# Patient Record
Sex: FEMALE | Race: WHITE | Employment: UNEMPLOYED | URBAN - METROPOLITAN AREA
[De-identification: names, ages, dates, MRNs, and addresses within clinical notes are randomized per-mention and may not be internally consistent; named-entity substitution may affect disease eponyms.]

---

## 2017-12-11 ENCOUNTER — OFFICE VISIT (OUTPATIENT)
Dept: OBGYN CLINIC | Age: 37
End: 2017-12-11

## 2017-12-11 VITALS
HEIGHT: 69 IN | WEIGHT: 234 LBS | SYSTOLIC BLOOD PRESSURE: 124 MMHG | BODY MASS INDEX: 34.66 KG/M2 | DIASTOLIC BLOOD PRESSURE: 80 MMHG

## 2017-12-11 DIAGNOSIS — Z01.419 ENCOUNTER FOR GYNECOLOGICAL EXAMINATION WITHOUT ABNORMAL FINDING: Primary | ICD-10-CM

## 2017-12-11 NOTE — PROGRESS NOTES
Annual exam ages 21-44    Mita Vazquez is a ,  31 y.o. female [facility] No LMP recorded. .    She presents for her annual checkup. She is having no significant problems. With regard to the Gardasil vaccine, she is older than the FDA approved age to receive it. Menstrual status:    Her periods are moderate in flow. She is using five to ten pads or tampons per day, usually regular and last 26-30 days. She denies dysmenorrhea. She reports no premenstrual symptoms. Contraception:    The current method of family planning is none. Sexual history:     She  reports that she currently engages in sexual activity and has had male partners. She reports using the following method of birth control/protection: Pill. .    Medical conditions:    Since her last annual GYN exam about one year ago, she has not the following changes in her health history: none. Pap and Mammogram History:    Her most recent Pap smear was normal, obtained 4 year(s) ago. The patient has never had a mammogram.    Breast Cancer History/Substance Abuse: negative    Past Medical History:   Diagnosis Date    AR (allergic rhinitis)     Basal cell carcinoma of face     left hairline    Infertility management      w dysmotility, not her issue    Infertility, female     IVF Pregnancy    Knee pain, bilateral     softball injuries    Miscarriage 2012    Pap smear 2009    Dr. Chloe Worley    Pap smear for cervical cancer screening 13 neg HPV NEG    Recurrent UTI 6/3/2013     Past Surgical History:   Procedure Laterality Date    HX MALIGNANT SKIN LESION EXCISION      basal cell       Current Outpatient Prescriptions   Medication Sig Dispense Refill    norgestimate-ethinyl estradiol (ORTHO-CYCLEN) 0.25-35 mg-mcg tab Take 1 Tab by mouth daily. 90 Tab 5    nitrofurantoin, macrocrystal-monohydrate, (MACROBID) 100 mg capsule Take 1 Cap by mouth daily as needed.  30 Cap 11    PNV no.24-iron-folic acid-dha (PRENATAL DHA+COMPLETE PRENATAL) -300 mg-mcg-mg cmpk Take  by mouth.  OTHER        Allergies: Latex     Tobacco History:  reports that she has never smoked. She has never used smokeless tobacco.  Alcohol Abuse:  reports that she drinks about 0.5 oz of alcohol per week   Drug Abuse:  reports that she does not use illicit drugs.     Family Medical/Cancer History:   Family History   Problem Relation Age of Onset    Cancer Father 48     pancreatic, dx 2/2009    Cancer Paternal Grandfather      cancer, unknown origin    Diabetes Maternal Grandmother     Hypertension Maternal Grandmother     Diabetes Paternal Grandmother     Hypertension Paternal Grandmother         Review of Systems - History obtained from the patient  Constitutional: negative for weight loss, fever, night sweats  HEENT: negative for hearing loss, earache, congestion, snoring, sorethroat  CV: negative for chest pain, palpitations, edema  Resp: negative for cough, shortness of breath, wheezing  GI: negative for change in bowel habits, abdominal pain, black or bloody stools  : negative for frequency, dysuria, hematuria, vaginal discharge  MSK: negative for back pain, joint pain, muscle pain  Breast: negative for breast lumps, nipple discharge, galactorrhea  Skin :negative for itching, rash, hives  Neuro: negative for dizziness, headache, confusion, weakness  Psych: negative for anxiety, depression, change in mood  Heme/lymph: negative for bleeding, bruising, pallor    Physical Exam    Visit Vitals    /80    Ht 5' 9\" (1.753 m)    Wt 234 lb (106.1 kg)    Breastfeeding No    BMI 34.56 kg/m2       Constitutional  · Appearance: well-nourished, well developed, alert, in no acute distress    HENT  · Head and Face: appears normal    Neck  · Inspection/Palpation: normal appearance, no masses or tenderness  · Lymph Nodes: no lymphadenopathy present  · Thyroid: gland size normal, nontender, no nodules or masses present on palpation    Chest  · Respiratory Effort: breathing unlabored    Breasts  · Inspection of Breasts: breasts symmetrical, no skin changes, no discharge present, nipple appearance normal, no skin retraction present  · Palpation of Breasts and Axillae: no masses present on palpation, no breast tenderness  · Axillary Lymph Nodes: no lymphadenopathy present    Gastrointestinal  · Abdominal Examination: abdomen non-tender to palpation, normal bowel sounds, no masses present  · Liver and spleen: no hepatomegaly present, spleen not palpable  · Hernias: no hernias identified    Genitourinary  · External Genitalia: normal appearance for age, no discharge present, no tenderness present, no inflammatory lesions present, no masses present, no atrophy present  · Vagina: normal vaginal vault without central or paravaginal defects, no discharge present, no inflammatory lesions present, no masses present  · Bladder: non-tender to palpation  · Urethra: appears normal  · Cervix: normal   · Uterus: normal size, shape and consistency  · Adnexa: no adnexal tenderness present, no adnexal masses present  · Perineum: perineum within normal limits, no evidence of trauma, no rashes or skin lesions present  · Anus: anus within normal limits, no hemorrhoids present  · Inguinal Lymph Nodes: no lymphadenopathy present    Skin  · General Inspection: no rash, no lesions identified    Neurologic/Psychiatric  · Mental Status:  · Orientation: grossly oriented to person, place and time  · Mood and Affect: mood normal, affect appropriate    Assessment:  Routine gynecologic examination  Her current medical status is satisfactory with no evidence of significant gynecologic issues.     Plan:  Counseled re: diet, exercise, healthy lifestyle  Return for yearly wellness visits  Pt counseled regarding co-testing for high risk HPV with pap  Rec screening mammo at either 37 or 42

## 2017-12-11 NOTE — PATIENT INSTRUCTIONS
out, toward the walls. ¨ Check the opening of your vagina for sores or swelling. ¨ Gently put a tool called a speculum into your vagina. It opens the vagina a little bit. You will feel some pressure. But if you are relaxed, it will not hurt. It lets your doctor see inside the vagina. ¨ Use a small brush, spatula, or swab to get a sample of cells, if you are having a Pap test or culture. The doctor then removes the speculum. ¨ Put on gloves and put one or two fingers of one hand into your vagina. The other hand goes on your lower belly. This lets your doctor feel your pelvic organs. You will probably feel some pressure. Try to stay relaxed. ¨ Put one gloved finger into your rectum and one into your vagina, if needed. This can also help check your pelvic organs. This exam takes about 10 minutes. At the end, you will get a washcloth or tissue to clean your vaginal area. It's normal to have some discharge after this exam. You can then get dressed. Some test results may be ready right away. But results from a culture or a Pap test may take several days or a few weeks. Why should you have a pelvic exam?  · You want to have recommended screening tests. This includes a Pap test.  · You think you have a vaginal infection. Signs include itching, burning, or unusual discharge. · You might have been exposed to a sexually transmitted infection (STI), such as chlamydia or herpes. · You have vaginal bleeding that is not part of your normal menstrual period. · You have pain in your belly or pelvis. · You have been sexually assaulted. A pelvic exam lets your doctor collect evidence and check for STIs. · You are pregnant. · You are having trouble getting pregnant. What are the risks of a pelvic exam?  There are no risks from a pelvic exam.  When should you call for help? Watch closely for changes in your health, and be sure to contact your doctor if you have any problems. Where can you learn more?   Go to http://sabine-jermaine.info/. Enter T584 in the search box to learn more about \"Pelvic Exam: Care Instructions. \"  Current as of: October 13, 2016  Content Version: 11.4  © 3554-8854 Healthwise, [employer]. Care instructions adapted under license by [place] (which disclaims liability or warranty for this information). If you have questions about a medical condition or this instruction, always ask your healthcare professional. [place] [address] any warranty or liability for your use of this information.

## 2017-12-11 NOTE — MR AVS SNAPSHOT
[address] [facility] 
[phone] Patient: Rosa Nice MRN: [id number] :1980 Visit Information Date & Time Provider Department Dept. Phone Encounter #  
 2017 10:10 AM Faustine Cogan, MD [place] [phone] [id number] Your Appointments 2017 10:10 AM  
ESTABLISHED PATIENT with Faustine Cogan, MD [place] [facility] ([address]) Appt Note: Annual Exam  
 [address] [facility] [address] [facility] [address] [id number]  
[address] [facility] Upcoming Health Maintenance Date Due  
 PAP AKA CERVICAL CYTOLOGY 2016 Influenza Age 5 to Adult 2017 Allergies as of 2017  Review Complete On: 2017 By: Cheryl Larkin Severity Noted Reaction Type Reactions Latex  2013    Unknown (comments) Current Immunizations  Reviewed on 2010 Name Date TDAP Vaccine 8/10/2010 Tdap 2014  1:05 PM  
  
 Not reviewed this visit Vitals BP Height(growth percentile) Weight(growth percentile) Breastfeeding? BMI OB Status 124/80 5' 9\" (1.753 m) 234 lb (106.1 kg) No 34.56 kg/m2 Having regular periods Smoking Status Never Smoker BMI and BSA Data Body Mass Index Body Surface Area 34.56 kg/m 2 2.27 m 2 Preferred Pharmacy Pharmacy Name Phone [facility], [address] [facility] Your Updated Medication List  
  
   
This list is accurate as of: 17 10:06 AM.  Always use your most recent med list.  
  
  
  
  
 nitrofurantoin (macrocrystal-monohydrate) 100 mg capsule Commonly known as:  MACROBID Take 1 Cap by mouth daily as needed. norgestimate-ethinyl estradiol 0.25-35 mg-mcg Tab Commonly known as:  Dasha Kothari Take 1 Tab by mouth daily. OTHER PRENATAL DHA+COMPLETE PRENATAL -300 mg-mcg-mg Cmpk Generic drug:  [id number] nicole-folic-dha Take  by mouth. Patient Instructions Pelvic Exam: Care Instructions Your Care Instructions When your doctor examines all of your pelvic organs, it's called a pelvic exam. Two good reasons to have this kind of exam are to check for sexually transmitted infections (STIs) and to get a Pap test. A Pap test is also called a Pap smear. It checks for early changes that can lead to cancer of the cervix. Sometimes a pelvic exam is part of a regular checkup. In this case, you can do some things to make your test results as accurate as possible. · Try to schedule the exam when you don't have your period. · Don't use douches, tampons, or vaginal medicines, sprays, or powders for 24 hours before your exam. 
· Don't have sex for 24 hours before your exam. 
Other times, women have this kind of exam at any time of the month. This is because they have pelvic pain, bleeding, or discharge. Or they may have another pelvic problem. Before your exam, it's important to share some information with your doctor. For example, if you are a survivor of rape or sexual abuse, you can talk about any concerns you may have. Your doctor will also want to know if you are pregnant or use birth control. And he or she will want to hear about any problems, surgeries, or procedures you have had in your pelvic area. You will also need to tell your doctor when your last period was. Follow-up care is a key part of your treatment and safety. Be sure to make and go to all appointments, and call your doctor if you are having problems. It's also a good idea to know your test results and keep a list of the medicines you take. How is a pelvic exam done? · During a pelvic exam, you will: ¨ Take off your clothes below the waist. You will get a paper or cloth cover to put over the lower half of your body. Rommie Filler on your back on an exam table. Your feet will be raised above you. Stirrups will support your feet. · The doctor will: ¨ Ask you to relax your knees. Your knees need to lean out, toward the walls. ¨ Check the opening of your vagina for sores or swelling. ¨ Gently put a tool called a speculum into your vagina. It opens the vagina a little bit. You will feel some pressure. But if you are relaxed, it will not hurt. It lets your doctor see inside the vagina. ¨ Use a small brush, spatula, or swab to get a sample of cells, if you are having a Pap test or culture. The doctor then removes the speculum. ¨ Put on gloves and put one or two fingers of one hand into your vagina. The other hand goes on your lower belly. This lets your doctor feel your pelvic organs. You will probably feel some pressure. Try to stay relaxed. ¨ Put one gloved finger into your rectum and one into your vagina, if needed. This can also help check your pelvic organs. This exam takes about 10 minutes. At the end, you will get a washcloth or tissue to clean your vaginal area. It's normal to have some discharge after this exam. You can then get dressed. Some test results may be ready right away. But results from a culture or a Pap test may take several days or a few weeks. Why should you have a pelvic exam? 
· You want to have recommended screening tests. This includes a Pap test. 
· You think you have a vaginal infection. Signs include itching, burning, or unusual discharge. · You might have been exposed to a sexually transmitted infection (STI), such as chlamydia or herpes. · You have vaginal bleeding that is not part of your normal menstrual period. · You have pain in your belly or pelvis. · You have been sexually assaulted. A pelvic exam lets your doctor collect evidence and check for STIs. · You are pregnant. · You are having trouble getting pregnant. What are the risks of a pelvic exam? 
There are no risks from a pelvic exam. 
When should you call for help? Watch closely for changes in your health, and be sure to contact your doctor if you have any problems. Where can you learn more? Go to http://sabine-jermaine.info/. Enter X570 in the search box to learn more about \"Pelvic Exam: Care Instructions. \" Current as of: October 13, 2016 Content Version: 11.4 © 5753-5087 [employer]. Care instructions adapted under license by [employer] (which disclaims liability or warranty for this information). If you have questions about a medical condition or this instruction, always ask your healthcare professional. [address] any warranty or liability for your use of this information. Introducing [facility] & HEALTH SERVICES! Dear Rosaline Galvan: 
Thank you for requesting a Wicron account. Our records indicate that you already have an active Wicron account. You can access your account anytime at [email or web address] Did you know that you can access your hospital and ER discharge instructions at any time in Wicron? You can also review all of your test results from your hospital stay or ER visit. Additional Information If you have questions, please visit the Frequently Asked Questions section of the Wicron website at [email or web address] Remember, Wicron is NOT to be used for urgent needs. For medical emergencies, dial 911. Now available from your iPhone and Android! Please provide this summary of care documentation to your next provider. Your primary care clinician is listed as Gigi Dudley. If you have any questions after today's visit, please call [phone].

## 2018-01-24 RX ORDER — NITROFURANTOIN 25; 75 MG/1; MG/1
100 CAPSULE ORAL
Qty: 30 CAP | Refills: 11 | Status: SHIPPED | OUTPATIENT
Start: 2018-01-24 | End: 2018-08-07 | Stop reason: ALTCHOICE

## 2018-08-07 ENCOUNTER — OFFICE VISIT (OUTPATIENT)
Dept: INTERNAL MEDICINE CLINIC | Age: 38
End: 2018-08-07

## 2018-08-07 VITALS
DIASTOLIC BLOOD PRESSURE: 89 MMHG | WEIGHT: 217 LBS | SYSTOLIC BLOOD PRESSURE: 122 MMHG | RESPIRATION RATE: 18 BRPM | OXYGEN SATURATION: 98 % | TEMPERATURE: 98.4 F | HEIGHT: 69 IN | HEART RATE: 81 BPM | BODY MASS INDEX: 32.14 KG/M2

## 2018-08-07 DIAGNOSIS — E66.9 OBESITY (BMI 30.0-34.9): ICD-10-CM

## 2018-08-07 DIAGNOSIS — Z00.00 WELL WOMAN EXAM (NO GYNECOLOGICAL EXAM): Primary | ICD-10-CM

## 2018-08-07 RX ORDER — TOPIRAMATE 25 MG/1
25 TABLET ORAL 2 TIMES DAILY WITH MEALS
Qty: 60 TAB | Refills: 2 | Status: SHIPPED | OUTPATIENT
Start: 2018-08-07 | End: 2018-12-23 | Stop reason: SDUPTHER

## 2018-08-07 RX ORDER — PHENTERMINE HYDROCHLORIDE 37.5 MG/1
TABLET ORAL
Qty: 30 TAB | Refills: 2 | Status: SHIPPED | OUTPATIENT
Start: 2018-08-07 | End: 2018-12-31 | Stop reason: SDUPTHER

## 2018-08-07 NOTE — MR AVS SNAPSHOT
[address] [facility] 
 
 
 [address] [place] [address] [facility] 
[phone] Patient: Tete Andres MRN: [id number] :1980 Visit Information Date & Time Provider Department Dept. Phone Encounter #  
 2018  9:00 AM Michelle Varela MD [facility] [address] [id number] Upcoming Health Maintenance Date Due Influenza Age 5 to Adult 2018 PAP AKA CERVICAL CYTOLOGY 2018 DTaP/Tdap/Td series (3 - Td) 2024 Allergies as of 2018  Review Complete On: 2018 By: Michelle Varela MD  
  
 Severity Noted Reaction Type Reactions Latex  2013    Unknown (comments) Current Immunizations  Reviewed on 2010 Name Date TDAP Vaccine 8/10/2010 Tdap 2014  1:05 PM  
  
 Not reviewed this visit You Were Diagnosed With   
  
 Codes Comments Well woman exam (no gynecological exam)    -  Primary ICD-10-CM: Z00.00 ICD-9-CM: V70.0 Obesity (BMI 30.0-34.9)     ICD-10-CM: O03.8 ICD-9-CM: 278.00 Vitals BP Pulse Temp Resp Height(growth percentile) Weight(growth percentile) 122/89 (BP 1 Location: Left arm, BP Patient Position: Sitting) 81 98.4 °F (36.9 °C) (Oral) 18 5' 9\" (1.753 m) 217 lb (98.4 kg) LMP SpO2 BMI OB Status Smoking Status 2018 (Within Days) 98% 32.05 kg/m2 Having regular periods Never Smoker Vitals History BMI and BSA Data Body Mass Index Body Surface Area 32.05 kg/m 2 2.19 m 2 Preferred Pharmacy Pharmacy Name Phone [facility], [address] [facility] Your Updated Medication List  
  
   
This list is accurate as of 18  9:47 AM.  Always use your most recent med list.  
  
  
  
  
 phentermine 37.5 mg tablet Commonly known as:  ADIPEX-P Take 1/2 pill in AM and 1/2 in early afternoon  
  
 topiramate 25 mg tablet Commonly known as:  TOPAMAX Take 1 Tab by mouth two (2) times daily (with meals). Prescriptions Printed Refills  
 phentermine (ADIPEX-P) 37.5 mg tablet 2 Sig: Take 1/2 pill in AM and 1/2 in early afternoon Class: Print  
 topiramate (TOPAMAX) 25 mg tablet 2 Sig: Take 1 Tab by mouth two (2) times daily (with meals). Class: Print Route: Oral  
  
We Performed the Following CBC WITH AUTOMATED DIFF [01544 CPT(R)] LIPID PANEL [71782 CPT(R)] METABOLIC PANEL, COMPREHENSIVE [82908 CPT(R)] TSH 3RD GENERATION [79194 CPT(R)] Introducing [facility] & HEALTH SERVICES! Dear Anne Brar: 
Thank you for requesting a ImmuMetrix account. Our records indicate that you already have an active ImmuMetrix account. You can access your account anytime at [email or web address] Did you know that you can access your hospital and ER discharge instructions at any time in ImmuMetrix? You can also review all of your test results from your hospital stay or ER visit. Additional Information If you have questions, please visit the Frequently Asked Questions section of the ImmuMetrix website at [email or web address] Remember, ImmuMetrix is NOT to be used for urgent needs. For medical emergencies, dial 911. Now available from your iPhone and Android! Please provide this summary of care documentation to your next provider. Your primary care clinician is listed as Collie Link. If you have any questions after today's visit, please call [phone].

## 2018-08-07 NOTE — PROGRESS NOTES
Yasir Mishra is a 30 y.o. female  Presenting for her annual checkup and follow-up    She is now . Was Christina Alcala MD.  Has Twin son and daughter born [birth year]. Last PAP/pelvic: per GYN Dr. Nevin Luong Date Due    Influenza Age 5 to Adult  08/01/2018    PAP AKA CERVICAL CYTOLOGY  11/20/2018    DTaP/Tdap/Td series (3 - Td) 06/08/2024     Would like to discuss weight loss options. She tries low calorie diet and exercise and plateus   Started Keto diet 2 weeks ago- lost 6 lb so far  Asks about pharm options. Lost 15-20 lb since 3/2018. Wt Readings from Last 3 Encounters:   08/07/18 217 lb (98.4 kg)   12/11/17 234 lb (106.1 kg)   04/06/16 199 lb (90.3 kg)   Blood pressure 122/89, pulse 81, temperature 98.4 °F (36.9 °C), temperature source Oral, resp. rate 18, height 5' 9\" (1.753 m), weight 217 lb (98.4 kg), last menstrual period 07/17/2018, SpO2 98 %. Exercise: very active - was doing P90X. Diet: generally follows a low fat low cholesterol diet    Vaccinations reviewed  Immunization History   Administered Date(s) Administered    TDAP Vaccine 08/10/2010    Tdap 06/08/2014       Allergies: Latex  No current outpatient prescriptions on file. No current facility-administered medications for this visit. has a past medical history of AR (allergic rhinitis); Basal cell carcinoma of face (2002); Infertility management; Infertility, female; Knee pain, bilateral; Miscarriage (12/2012); Pap smear (11/2009); Pap smear for cervical cancer screening (11/20/13 neg HPV NEG); and Recurrent UTI (6/3/2013). She also has no past medical history of Abnormal Pap smear; Anemia; Asthma; Chlamydia; Complication of anesthesia; Diabetes ([address] Essential hypertension; Genital herpes, unspecified; Gonorrhea; Heart abnormality; Herpes gestationis;  Herpes simplex without mention of complication; Human immunodeficiency virus (HIV) disease ([address] OTHER MEDICAL; Kidney disease; Liver disease; Phlebitis and thrombophlebitis of unspecified site; Pituitary disorder ([facility] Polycystic disease, ovaries; Postpartum depression; Psychiatric problem; Rhesus isoimmunization unspecified as to episode of care in pregnancy; Sickle-cell disease, unspecified; Syphilis; Systemic lupus erythematosus ([facility] Thyroid activity decreased; Trauma; Unspecified breast disorder; Unspecified diseases of blood and blood-forming organs; or Unspecified epilepsy without mention of intractable epilepsy. Past Surgical History:   Procedure Laterality Date    HX MALIGNANT SKIN LESION EXCISION  2002    basal cell      Social History     Social History    Marital status:      Spouse name: Preston Villarreal Number of children: 2    Years of education: N/A     Occupational History    Not on file. Social History Main Topics    Smoking status: Never Smoker    Smokeless tobacco: Never Used    Alcohol use 0.5 oz/week     1 Standard drinks or equivalent per week      Comment: once a week    Drug use: No    Sexual activity: Yes     Partners: Male     Birth control/ protection: Pill     Other Topics Concern    Not on file     Social History Narrative    Twins - son, daughter born 6/2014     Family History   Problem Relation Age of Onset    Cancer Father 48     pancreatic, dx 2/2009    Cancer Paternal Grandfather      cancer, unknown origin    Diabetes Maternal Grandmother     Hypertension Maternal Grandmother     Diabetes Paternal Grandmother     Hypertension Paternal Grandmother        Review of Systems - History obtained from the patient  General ROS: negative for - night sweats, weight gain or weight loss  Cardiovascular ROS: no chest pain, dyspnea on exertion, edema  GYN ROS: normal menses, no abnormal bleeding, pelvic pain or discharge, no breast pain or new or enlarging lumps on self exam.    Physical exam  Blood pressure 122/89, pulse 81, temperature 98.4 °F (36.9 °C), temperature source Oral, resp. rate 18, height 5' 9\" (1.753 m), weight 217 lb (98.4 kg), last menstrual period 07/17/2018, SpO2 98 %. Wt Readings from Last 3 Encounters:   08/07/18 217 lb (98.4 kg)   12/11/17 234 lb (106.1 kg)   04/06/16 199 lb (90.3 kg)     she appears well, alert and oriented x 3, pleasant and cooperative. Vitals as noted. No rashes or significant lesions. Neck supple and free of adenopathy, or masses. No thyromegaly or carotid bruits. Cranial nerves normal. Lungs are clear to auscultation. Heart sounds are normal with no murmurs, clicks, gallops or rubs. Abdomen is soft, non- tender, with no masses or organomegaly. Extremities, peripheral pulses and reflexes are normal.  . BREAST EXAM: not examined    Diagnoses and all orders for this visit:    1. Well woman exam (no gynecological exam)  -     METABOLIC PANEL, COMPREHENSIVE  -     CBC WITH AUTOMATED DIFF  -     LIPID PANEL  -     TSH 3RD GENERATION    2. Obesity (BMI 30.0-34.9)- is on Keto diet. Is plateuing. Trial of meds. Potential medication side effects were discussed with the patient; let me know if any occur.  -     phentermine (ADIPEX-P) 37.5 mg tablet; Take 1/2 pill in AM and 1/2 in early afternoon  -     topiramate (TOPAMAX) 25 mg tablet; Take 1 Tab by mouth two (2) times daily (with meals).     The patient is asked to make an attempt to improve diet and exercise patterns  Return for yearly wellness visits

## 2018-08-08 LAB
ALBUMIN SERPL-MCNC: 4.8 G/DL (ref 3.5–5.5)
ALBUMIN/GLOB SERPL: 1.8 {RATIO} (ref 1.2–2.2)
ALP SERPL-CCNC: 65 IU/L (ref 39–117)
ALT SERPL-CCNC: 11 IU/L (ref 0–32)
AST SERPL-CCNC: 17 IU/L (ref 0–40)
BASOPHILS # BLD AUTO: 0 X10E3/UL (ref 0–0.2)
BASOPHILS NFR BLD AUTO: 0 %
BILIRUB SERPL-MCNC: 0.3 MG/DL (ref 0–1.2)
BUN SERPL-MCNC: 13 MG/DL (ref 6–20)
BUN/CREAT SERPL: 18 (ref 9–23)
CALCIUM SERPL-MCNC: 9.5 MG/DL (ref 8.7–10.2)
CHLORIDE SERPL-SCNC: 103 MMOL/L (ref 96–106)
CHOLEST SERPL-MCNC: 259 MG/DL (ref 100–199)
CO2 SERPL-SCNC: 20 MMOL/L (ref 20–29)
CREAT SERPL-MCNC: 0.74 MG/DL (ref 0.57–1)
EOSINOPHIL # BLD AUTO: 0 X10E3/UL (ref 0–0.4)
EOSINOPHIL NFR BLD AUTO: 0 %
ERYTHROCYTE [DISTWIDTH] IN BLOOD BY AUTOMATED COUNT: 14.6 % (ref 12.3–15.4)
GLOBULIN SER CALC-MCNC: 2.6 G/DL (ref 1.5–4.5)
GLUCOSE SERPL-MCNC: 83 MG/DL (ref 65–99)
HCT VFR BLD AUTO: 41.1 % (ref 34–46.6)
HDLC SERPL-MCNC: 96 MG/DL
HGB BLD-MCNC: 13.8 G/DL (ref 11.1–15.9)
IMM GRANULOCYTES # BLD: 0 X10E3/UL (ref 0–0.1)
IMM GRANULOCYTES NFR BLD: 0 %
INTERPRETATION, 910389: NORMAL
LDLC SERPL CALC-MCNC: 151 MG/DL (ref 0–99)
LYMPHOCYTES # BLD AUTO: 1.9 X10E3/UL (ref 0.7–3.1)
LYMPHOCYTES NFR BLD AUTO: 31 %
MCH RBC QN AUTO: 27.9 PG (ref 26.6–33)
MCHC RBC AUTO-ENTMCNC: 33.6 G/DL (ref 31.5–35.7)
MCV RBC AUTO: 83 FL (ref 79–97)
MONOCYTES # BLD AUTO: 0.4 X10E3/UL (ref 0.1–0.9)
MONOCYTES NFR BLD AUTO: 7 %
NEUTROPHILS # BLD AUTO: 3.8 X10E3/UL (ref 1.4–7)
NEUTROPHILS NFR BLD AUTO: 62 %
PLATELET # BLD AUTO: 205 X10E3/UL (ref 150–379)
POTASSIUM SERPL-SCNC: 4.5 MMOL/L (ref 3.5–5.2)
PROT SERPL-MCNC: 7.4 G/DL (ref 6–8.5)
RBC # BLD AUTO: 4.95 X10E6/UL (ref 3.77–5.28)
SODIUM SERPL-SCNC: 138 MMOL/L (ref 134–144)
TRIGL SERPL-MCNC: 62 MG/DL (ref 0–149)
TSH SERPL DL<=0.005 MIU/L-ACNC: 2.26 UIU/ML (ref 0.45–4.5)
VLDLC SERPL CALC-MCNC: 12 MG/DL (ref 5–40)
WBC # BLD AUTO: 6.1 X10E3/UL (ref 3.4–10.8)

## 2018-10-09 ENCOUNTER — OFFICE VISIT (OUTPATIENT)
Dept: INTERNAL MEDICINE CLINIC | Age: 38
End: 2018-10-09

## 2018-10-09 VITALS
HEIGHT: 69 IN | HEART RATE: 94 BPM | DIASTOLIC BLOOD PRESSURE: 81 MMHG | OXYGEN SATURATION: 99 % | SYSTOLIC BLOOD PRESSURE: 122 MMHG | TEMPERATURE: 98.1 F | BODY MASS INDEX: 29.77 KG/M2 | RESPIRATION RATE: 18 BRPM | WEIGHT: 201 LBS

## 2018-10-09 DIAGNOSIS — Z23 ENCOUNTER FOR IMMUNIZATION: ICD-10-CM

## 2018-10-09 DIAGNOSIS — G47.00 INSOMNIA, UNSPECIFIED TYPE: Primary | ICD-10-CM

## 2018-10-09 RX ORDER — NITROFURANTOIN 25; 75 MG/1; MG/1
100 CAPSULE ORAL AS NEEDED
Refills: 11 | COMMUNITY
Start: 2018-09-10 | End: 2018-10-09 | Stop reason: HOSPADM

## 2018-10-09 NOTE — MR AVS SNAPSHOT
[facility] 
 
 
 [address] [place] [address] [facility] 
[phone] Patient: Teddy Blackburn MRN: [id number] :1980 Visit Information Date & Time Provider Department Dept. Phone Encounter #  
 10/9/2018  8:45 AM Princess Rhoades MD [facility] [address] [facility] [id number] Follow-up Instructions Return in about 4 months (around 2019). Upcoming Health Maintenance Date Due Influenza Age 5 to Adult 2018 PAP AKA CERVICAL CYTOLOGY 2018 DTaP/Tdap/Td series (3 - Td) 2024 Allergies as of 10/9/2018  Review Complete On: 10/9/2018 By: Connie Roberson LPN Severity Noted Reaction Type Reactions Latex  2013    Unknown (comments) Current Immunizations  Reviewed on 2010 Name Date Influenza Vaccine (Quad) PF  Incomplete TDAP Vaccine 8/10/2010 Tdap 2014  1:05 PM  
  
 Not reviewed this visit You Were Diagnosed With   
  
 Codes Comments Insomnia, unspecified type    -  Primary ICD-10-CM: G47.00 ICD-9-CM: 780.52 BMI 29.0-29.9,adult     ICD-10-CM: D80.35 ICD-9-CM: V85.25 Encounter for immunization     ICD-10-CM: H51 ICD-9-CM: V03.89 Vitals BP Pulse Temp Resp Height(growth percentile) Weight(growth percentile) 122/81 (BP 1 Location: Left arm, BP Patient Position: Sitting) 94 98.1 °F (36.7 °C) (Oral) 18 5' 9\" (1.753 m) 201 lb (91.2 kg) LMP SpO2 BMI OB Status Smoking Status 2018 99% 29.68 kg/m2 Having regular periods Never Smoker Vitals History BMI and BSA Data Body Mass Index Body Surface Area  
 29.68 kg/m 2 2.11 m 2 Preferred Pharmacy Pharmacy Name Phone [facility] [place], [address] [facility] Your Updated Medication List  
  
   
This list is accurate as of 10/9/18  9:19 AM.  Always use your most recent med list.  
  
  
  
  
 phentermine 37.5 mg tablet Commonly known as:  ADIPEX-P Take 1/2 pill in AM and 1/2 in early afternoon  
  
 topiramate 25 mg tablet Commonly known as:  TOPAMAX Take 1 Tab by mouth two (2) times daily (with meals). We Performed the Following INFLUENZA VIRUS VAC QUAD,SPLIT,PRESV FREE SYRINGE IM [id number] CPT(R)] Follow-up Instructions Return in about 4 months (around 2/9/2019). Introducing [facility]! Dear Pankaj Hyde: 
Thank you for requesting a [employer] account. Our records indicate that you already have an active [employer]ASO account. You can access your account anytime at [email or web address] Did you know that you can access your hospital and ER discharge instructions at any time in L[employer]ASO? You can also review all of your test results from your hospital stay or ER visit. Additional Information If you have questions, please visit the Frequently Asked Questions section of the L[employer]O website at [email or web address] Remember, LAFASO is NOT to be used for urgent needs. For medical emergencies, dial 911. Now available from your iPhone and Android! Please provide this summary of care documentation to your next provider. Your primary care clinician is listed as Carter Love. If you have any questions after today's visit, please call [phone].

## 2018-10-09 NOTE — PROGRESS NOTES
HISTORY OF PRESENT ILLNESS    Chief Complaint   Patient presents with    Weight Management    Immunization/Injection     Flu vac     Insomnia  Improving some. Taking topamax hs and is helps some. Presents for follow-up of weight management. She is tolerating phentermine 1/2 pill in AM only and topamax 25 mg hs only. Can not tolerate taking either med bid. Lost 40 lb since 3/2018  Medications help with cravings  On keto diet since 7/2018 and tolerating well with a few breaks. Wt Readings from Last 3 Encounters:   10/09/18 201 lb (91.2 kg)   08/07/18 217 lb (98.4 kg)   12/11/17 234 lb (106.1 kg)       Review of Systems   All other systems reviewed and are negative, except as noted in HPI    Past Medical and Surgical History   has a past medical history of AR (allergic rhinitis); Basal cell carcinoma of face (2002); Infertility management; Infertility, female; Knee pain, bilateral; Miscarriage (12/2012); Pap smear (11/2009); Pap smear for cervical cancer screening (11/20/13 neg HPV NEG); and Recurrent UTI (6/3/2013). She also has no past medical history of Abnormal Pap smear; Anemia; Asthma; Chlamydia; Complication of anesthesia; Diabetes ([address] Essential hypertension; Genital herpes, unspecified; Gonorrhea; Heart abnormality; Herpes gestationis; Herpes simplex without mention of complication; Human immunodeficiency virus (HIV) disease ([address] OTHER MEDICAL; Kidney disease; Liver disease; Phlebitis and thrombophlebitis of unspecified site; Pituitary disorder ([address] Polycystic disease, ovaries; Postpartum depression; Psychiatric problem; Rhesus isoimmunization unspecified as to episode of care in pregnancy; Sickle-cell disease, unspecified; Syphilis; Systemic lupus erythematosus ([address] Thyroid activity decreased; Trauma; Unspecified breast disorder; Unspecified diseases of blood and blood-forming organs; or Unspecified epilepsy without mention of intractable epilepsy.    has a past surgical history that includes hx malignant skin lesion excision (2002). reports that she has never smoked. She has never used smokeless tobacco. She reports that she drinks about 0.5 oz of alcohol per week  She reports that she does not use illicit drugs. family history includes Cancer in her paternal grandfather; Cancer (age of onset: 48) in her father; Diabetes in her maternal grandmother and paternal grandmother; Hypertension in her maternal grandmother and paternal grandmother. Physical Exam   Nursing note and vitals reviewed. Blood pressure 122/81, pulse 94, temperature 98.1 °F (36.7 °C), temperature source Oral, resp. rate 18, height 5' 9\" (1.753 m), weight 201 lb (91.2 kg), last menstrual period 09/28/2018, SpO2 99 %. Constitutional:  No distress. Eyes: Conjunctivae are normal.   Ears:  Hearing grossly intact  Cardiovascular: Normal rate. regular rhythm, no murmurs or gallops  No edema  Pulmonary/Chest: Effort normal.   CTAB  Musculoskeletal: moves all 4 extremities   Neurological: Alert and oriented to person, place, and time. Skin: No rash noted. Psychiatric: Normal mood and affect. Behavior is normal.     ASSESSMENT and PLAN  Diagnoses and all orders for this visit:    1. Insomnia, unspecified type  Controlled on current regimen. Continue current medications as written in chart. 2. BMI 29.0-29.9,adult  Doing well  Cont meds, each once daily  Cont keto diet for now  F/u 3-4 months. 3. Encounter for immunization  -     Influenza virus vaccine (QUADRIVALENT PRES FREE SYRINGE) IM (03201)      lab results and schedule of future lab studies reviewed with patient  reviewed medications and side effects in detail  Return to clinic for further evaluation if new symptoms develop    Current Outpatient Prescriptions   Medication Sig    phentermine (ADIPEX-P) 37.5 mg tablet Take 1/2 pill in AM and 1/2 in early afternoon    topiramate (TOPAMAX) 25 mg tablet Take 1 Tab by mouth two (2) times daily (with meals). No current facility-administered medications for this visit.

## 2018-12-23 DIAGNOSIS — E66.9 OBESITY (BMI 30.0-34.9): ICD-10-CM

## 2018-12-24 RX ORDER — TOPIRAMATE 25 MG/1
TABLET ORAL
Qty: 60 TAB | Refills: 2 | Status: SHIPPED | OUTPATIENT
Start: 2018-12-24 | End: 2019-05-29 | Stop reason: SDUPTHER

## 2018-12-28 ENCOUNTER — OFFICE VISIT (OUTPATIENT)
Dept: OBGYN CLINIC | Age: 38
End: 2018-12-28

## 2018-12-28 VITALS
DIASTOLIC BLOOD PRESSURE: 76 MMHG | HEIGHT: 69 IN | SYSTOLIC BLOOD PRESSURE: 116 MMHG | BODY MASS INDEX: 28.58 KG/M2 | WEIGHT: 193 LBS

## 2018-12-28 DIAGNOSIS — Z01.419 WELL FEMALE EXAM WITH ROUTINE GYNECOLOGICAL EXAM: Primary | ICD-10-CM

## 2018-12-28 NOTE — PROGRESS NOTES
Annual exam ages 21-44    Agustin Garg is a ,  41 y.o. female [facility] No LMP recorded. .    She presents for her annual checkup. She is having no significant problems. With regard to the Gardasil vaccine, she is older than the FDA approved age to receive it. Menstrual status:    Her periods are moderate in flow. She is using three to five pads or tampons per day, often irregular    She denies dysmenorrhea. She reports no premenstrual symptoms. Contraception:    The current method of family planning is none. Sexual history:     She  reports that she currently engages in sexual activity and has had partners who are Male. She reports using the following method of birth control/protection: None. .    Medical conditions:    Since her last annual GYN exam about one year ago, she has not the following changes in her health history: none. Pap and Mammogram History:    Her most recent Pap smear was normal, obtained 5 year(s) ago.     The patient has never had a mammogram.    Breast Cancer History/Substance Abuse: negative    Past Medical History:   Diagnosis Date    AR (allergic rhinitis)     Basal cell carcinoma of face     left hairline    Infertility management      w dysmotility, not her issue    Infertility, female     IVF Pregnancy    Knee pain, bilateral     softball injuries    Miscarriage 2012    Pap smear 2009    Dr. Scout Martinez    Pap smear for cervical cancer screening 13 neg HPV NEG    Recurrent UTI 6/3/2013     Past Surgical History:   Procedure Laterality Date    HX MALIGNANT SKIN LESION EXCISION      basal cell       Current Outpatient Medications   Medication Sig Dispense Refill    topiramate (TOPAMAX) 25 mg tablet TAKE 1 TABLET BY MOUTH TWICE DAILY WITH MEALS 60 Tab 2    phentermine (ADIPEX-P) 37.5 mg tablet Take 1/2 pill in AM and 1/2 in early afternoon 30 Tab 2     Allergies: Latex     Tobacco History:  reports that has never smoked. she has never used smokeless tobacco.  Alcohol Abuse:  reports that she drinks about 0.5 oz of alcohol per week. Drug Abuse:  reports that she does not use drugs.     Family Medical/Cancer History:   Family History   Problem Relation Age of Onset    Cancer Father 48        pancreatic, dx 2/2009    Cancer Paternal Grandfather         cancer, unknown origin    Diabetes Maternal Grandmother     Hypertension Maternal Grandmother     Diabetes Paternal Grandmother     Hypertension Paternal Grandmother         Review of Systems - History obtained from the patient  Constitutional: negative for weight loss, fever, night sweats  HEENT: negative for hearing loss, earache, congestion, snoring, sorethroat  CV: negative for chest pain, palpitations, edema  Resp: negative for cough, shortness of breath, wheezing  GI: negative for change in bowel habits, abdominal pain, black or bloody stools  : negative for frequency, dysuria, hematuria, vaginal discharge  MSK: negative for back pain, joint pain, muscle pain  Breast: negative for breast lumps, nipple discharge, galactorrhea  Skin :negative for itching, rash, hives  Neuro: negative for dizziness, headache, confusion, weakness  Psych: negative for anxiety, depression, change in mood  Heme/lymph: negative for bleeding, bruising, pallor    Physical Exam    Visit Vitals  /76   Ht 5' 9\" (1.753 m)   Wt 193 lb (87.5 kg)   BMI 28.50 kg/m²       Constitutional  · Appearance: well-nourished, well developed, alert, in no acute distress    HENT  · Head and Face: appears normal    Neck  · Inspection/Palpation: normal appearance, no masses or tenderness  · Lymph Nodes: no lymphadenopathy present  · Thyroid: gland size normal, nontender, no nodules or masses present on palpation    Chest  · Respiratory Effort: breathing unlabored    Breasts  · Inspection of Breasts: breasts symmetrical, no skin changes, no discharge present, nipple appearance normal, no skin retraction present  · Palpation of Breasts and Axillae: no masses present on palpation, no breast tenderness  · Axillary Lymph Nodes: no lymphadenopathy present    Gastrointestinal  · Abdominal Examination: abdomen non-tender to palpation, normal bowel sounds, no masses present  · Liver and spleen: no hepatomegaly present, spleen not palpable  · Hernias: no hernias identified    Genitourinary  · External Genitalia: normal appearance for age, no discharge present, no tenderness present, no inflammatory lesions present, no masses present, no atrophy present  · Vagina: normal vaginal vault without central or paravaginal defects, no discharge present, no inflammatory lesions present, no masses present  · Bladder: non-tender to palpation  · Urethra: appears normal  · Cervix: normal   · Uterus: normal size, shape and consistency  · Adnexa: no adnexal tenderness present, no adnexal masses present  · Perineum: perineum within normal limits, no evidence of trauma, no rashes or skin lesions present  · Anus: anus within normal limits, no hemorrhoids present  · Inguinal Lymph Nodes: no lymphadenopathy present    Skin  · General Inspection: no rash, no lesions identified    Neurologic/Psychiatric  · Mental Status:  · Orientation: grossly oriented to person, place and time  · Mood and Affect: mood normal, affect appropriate    Assessment:  Routine gynecologic examination  Her current medical status is satisfactory with no evidence of significant gynecologic issues.     Plan:  Counseled re: diet, exercise, healthy lifestyle  Return for yearly wellness visits  Pt counseled regarding co-testing for high risk HPV with pap  Rec screening mammo at either 37 or 42

## 2018-12-30 LAB
CYTOLOGIST CVX/VAG CYTO: NORMAL
CYTOLOGY CVX/VAG DOC THIN PREP: NORMAL
CYTOLOGY HISTORY:: NORMAL
DX ICD CODE: NORMAL
HPV I/H RISK 1 DNA CVX QL PROBE+SIG AMP: NEGATIVE
Lab: NORMAL
OTHER STN SPEC: NORMAL
PATH REPORT.FINAL DX SPEC: NORMAL
STAT OF ADQ CVX/VAG CYTO-IMP: NORMAL

## 2018-12-31 DIAGNOSIS — E66.9 OBESITY (BMI 30.0-34.9): ICD-10-CM

## 2018-12-31 RX ORDER — PHENTERMINE HYDROCHLORIDE 37.5 MG/1
TABLET ORAL
Qty: 30 TAB | Refills: 2 | Status: SHIPPED | OUTPATIENT
Start: 2018-12-31 | End: 2019-10-09 | Stop reason: ALTCHOICE

## 2019-05-29 DIAGNOSIS — E66.9 OBESITY (BMI 30.0-34.9): ICD-10-CM

## 2019-05-29 RX ORDER — TOPIRAMATE 25 MG/1
TABLET ORAL
Qty: 60 TAB | Refills: 2 | Status: SHIPPED | OUTPATIENT
Start: 2019-05-29 | End: 2019-10-09 | Stop reason: ALTCHOICE

## 2019-07-01 ENCOUNTER — OFFICE VISIT (OUTPATIENT)
Dept: OBGYN CLINIC | Age: 39
End: 2019-07-01

## 2019-07-01 DIAGNOSIS — N93.8 DUB (DYSFUNCTIONAL UTERINE BLEEDING): Primary | ICD-10-CM

## 2019-07-01 NOTE — PROGRESS NOTES
Abnormal bleeding note      Zaiar Adan is a 41 y.o. female who complains of vaginal bleeding problems. Her current method of family planning is none. She developed this problem approximately 3 months ago. She has had vaginal bleeding which she describes as light, heavy, spotting lasting up to 7 days. Associated symptoms include mood swings. Alleviating factors: none    Aggravating factors: none      The patient is sexually active. Last Pap smear:was normal.    Her relevant past medical history:   Past Medical History:   Diagnosis Date    AR (allergic rhinitis)     Basal cell carcinoma of face 2002    left hairline    Infertility management      w dysmotility, not her issue    Infertility, female     IVF Pregnancy    Knee pain, bilateral     softball injuries    Miscarriage 12/2012    Pap smear 11/2009    Dr. Juan Carlos Leyva    Pap smear for cervical cancer screening 11/20/13 neg HPV NEG 12/28/18 neg HPV NEG    Recurrent UTI 6/3/2013        Past Surgical History:   Procedure Laterality Date    HX MALIGNANT SKIN LESION EXCISION  2002    basal cell     Social History     Occupational History    Not on file   Tobacco Use    Smoking status: Never Smoker    Smokeless tobacco: Never Used   Substance and Sexual Activity    Alcohol use:  Yes     Alcohol/week: 0.5 oz     Types: 1 Standard drinks or equivalent per week     Comment: once a week    Drug use: No    Sexual activity: Yes     Partners: Male     Birth control/protection: None     Family History   Problem Relation Age of Onset    Cancer Father 48        pancreatic, dx 2/2009    Cancer Paternal Grandfather         cancer, unknown origin    Diabetes Maternal Grandmother     Hypertension Maternal Grandmother     Diabetes Paternal Grandmother     Hypertension Paternal Grandmother        Allergies   Allergen Reactions    Latex Unknown (comments)     Prior to Admission medications    Medication Sig Start Date End Date Taking?  Authorizing Provider   topiramate (TOPAMAX) 25 mg tablet TAKE 1 TABLET BY MOUTH TWICE DAILY WITH MEALS 5/29/19  Yes Deysi Julio MD   phentermine (ADIPEX-P) 37.5 mg tablet Take 1/2 pill in AM and 1/2 in early afternoon 12/31/18  Yes Hesham Chen MD        Review of Systems - History obtained from the patient  Constitutional: negative for weight loss, fever, night sweats  HEENT: negative for hearing loss, earache, congestion, snoring, sorethroat  CV: negative for chest pain, palpitations, edema  Resp: negative for cough, shortness of breath, wheezing  Breast: negative for breast lumps, nipple discharge, galactorrhea  GI: negative for change in bowel habits, abdominal pain, black or bloody stools  : negative for frequency, dysuria, hematuria  MSK: negative for back pain, joint pain, muscle pain  Skin: negative for itching, rash, hives  Neuro: negative for dizziness, headache, confusion, weakness  Psych: negative for anxiety, depression, change in mood  Heme/lymph: negative for bleeding, bruising, pallor      Objective:    Visit Vitals  LMP 06/20/2019 (Approximate)          PHYSICAL EXAMINATION    Constitutional  · Appearance: well-nourished, well developed, alert, in no acute distress    HENT  · Head and Face: appears normal    Neck  · Inspection/Palpation: normal appearance, no masses or tenderness  · Lymph Nodes: no lymphadenopathy present  · Thyroid: gland size normal, nontender, no nodules or masses present on palpation    Breasts  · Inspection of Breasts: breasts symmetrical, no skin changes, no discharge present, nipple appearance normal, no skin retraction present  · Palpation of Breasts and Axillae: no masses present on palpation, no breast tenderness  · Axillary Lymph Nodes: no lymphadenopathy present    Gastrointestinal  · Abdominal Examination: abdomen non-tender to palpation, normal bowel sounds, no masses present  · Liver and spleen: no hepatomegaly present, spleen not palpable  · Hernias: no hernias identified    Genitourinary  · External Genitalia: normal appearance for age, no discharge present, no tenderness present, no inflammatory lesions present, no masses present, no atrophy present  · Vagina: normal vaginal vault without central or paravaginal defects, no discharge present, no inflammatory lesions present, no masses present  · Bladder: non-tender to palpation  · Urethra: appears normal  · Cervix: normal   · Uterus: normal size, shape and consistency  · Adnexa: no adnexal tenderness present, no adnexal masses present  · Perineum: perineum within normal limits, no evidence of trauma, no rashes or skin lesions present  · Anus: anus within normal limits, no hemorrhoids present  · Inguinal Lymph Nodes: no lymphadenopathy present    Skin  · General Inspection: no rash, no lesions identified    Neurologic/Psychiatric  · Mental Status:  · Orientation: grossly oriented to person, place and time  · Mood and Affect: mood normal, affect appropriate    Assessment/Plan:   AUB- full period q 2 weeks- will rto for SIS and possible end biopsy. Discussed possibility of ablation as she is not interested in hormonal management- she previously did not do well on ocps. Will check cbc and tsh today. Instructions given to pt. Handouts given to pt.

## 2019-07-02 LAB
ERYTHROCYTE [DISTWIDTH] IN BLOOD BY AUTOMATED COUNT: 13.2 % (ref 12.3–15.4)
FT4I SERPL CALC-MCNC: 1.4 (ref 1.2–4.9)
HCT VFR BLD AUTO: 39.4 % (ref 34–46.6)
HGB BLD-MCNC: 12.5 G/DL (ref 11.1–15.9)
MCH RBC QN AUTO: 26.4 PG (ref 26.6–33)
MCHC RBC AUTO-ENTMCNC: 31.7 G/DL (ref 31.5–35.7)
MCV RBC AUTO: 83 FL (ref 79–97)
PLATELET # BLD AUTO: 229 X10E3/UL (ref 150–450)
RBC # BLD AUTO: 4.73 X10E6/UL (ref 3.77–5.28)
T3RU NFR SERPL: 25 % (ref 24–39)
T4 SERPL-MCNC: 5.6 UG/DL (ref 4.5–12)
TSH SERPL DL<=0.005 MIU/L-ACNC: 2.75 UIU/ML (ref 0.45–4.5)
WBC # BLD AUTO: 4.4 X10E3/UL (ref 3.4–10.8)

## 2019-07-11 ENCOUNTER — OFFICE VISIT (OUTPATIENT)
Dept: OBGYN CLINIC | Age: 39
End: 2019-07-11

## 2019-07-11 DIAGNOSIS — N93.8 DUB (DYSFUNCTIONAL UTERINE BLEEDING): Primary | ICD-10-CM

## 2019-07-11 RX ORDER — NITROFURANTOIN 25; 75 MG/1; MG/1
100 CAPSULE ORAL AS NEEDED
Qty: 30 CAP | Refills: 11 | Status: SHIPPED | OUTPATIENT
Start: 2019-07-11 | End: 2022-01-27 | Stop reason: ALTCHOICE

## 2019-07-12 NOTE — PROGRESS NOTES
Sonohysterography procedure    Piedad Rushing is a ,  41 y.o. female [facility] Patient's last menstrual period was 2019 (approximate). She presents for a sonohysterography. The indications for this procedure were reviewed with the patient. The procedure was explained in detail and all questions were answered. Procedure: The patient was placed in the lithotomy position. A graves speculum was introduced into the vagina and the cervix was visualized. The cervix was prepped with iodine solution. A Fuze's Hysterography catheter was then introduced into the uterine cavity and the speculum was removed. Sterile sonohysterography with 3D Reconstruction was performed. The endometrial cavity was distended with sterile saline. The findings are as follows: normal cavity with no lesions. The patient tolerated the procedure well without complication, and was discharged to home.

## 2019-08-07 ENCOUNTER — OFFICE VISIT (OUTPATIENT)
Dept: OBGYN CLINIC | Age: 39
End: 2019-08-07

## 2019-08-07 VITALS
WEIGHT: 211.2 LBS | HEIGHT: 69 IN | SYSTOLIC BLOOD PRESSURE: 136 MMHG | BODY MASS INDEX: 31.28 KG/M2 | DIASTOLIC BLOOD PRESSURE: 78 MMHG

## 2019-08-07 DIAGNOSIS — Z30.430 ENCOUNTER FOR INSERTION OF MIRENA IUD: Primary | ICD-10-CM

## 2019-08-07 NOTE — PROGRESS NOTES
IUD INSERTION NOTE    Chart reviewed for the following:  I have reviewed the History & Physical and updated Godwin Dinh allergies. TIME OUT performed immediately prior to start of procedure:  I performed the following reviews on Vira Tirado prior to the start of the procedure:  Patient was identified by name and date of birth   Agreement on procedure being performed was verified  Risks and Benefits explained to the patient  Consent was signed and verified  Time: 1130  Date of procedure: 8/7/2019  Procedure performed by: Krystal Chappell  Assistant:  Radha Swanson  How tolerated by patient: Well  Post Procedural Pain Scale: 2-Hurts Minimally  Comments: None    Indications:  Godwin Dinh is a 41 y.o., [phone], whose No LMP recorded. , presents for insertion of an IUD. The risks, benefits and alternatives of IUD insertion were discussed in detail at last visit and at this visit. She also has reviewed IUD information herself. She has elected to proceed with the insertion today and she states she has no further questions. She signed the consent form. A urine pregnancy test was negative. Procedure:  She was placed in dorsal lithotomy position. On bimanual exam the uterus was anteverted and normal in size with no tenderness present. A speculum was inserted into the vagina and the cervix was visualized. The anterior lip of the cervix was grasped with an Gisel clamp. The uterus was sounded with a Huffman sound to 7 centimeters. A MIRENA IUD was then inserted without difficulty per  instructions. The string was cut to 3 centimeters. She experienced a mild amount of cramping. Post Procedure Status:   She tolerated the procedure with mild discomfort. The patient was observed for 10 minutes after the insertion. There were no complications. Patient was discharged in stable condition. She understands the IUD will not protect from sexually transmitted diseases including HIV.  She also understands she may have heavier/irregular bleeding for for the first 3-6 months with her IUD but that periods generally diminish in amount thereafter. She also understands the IUD must be removed before 5 years from today. She will contact us if she experiences  Any significant or increasing bleeding, pain, fever, shaking, chills or any other concerning signs or symptoms. She will also contact us or seek care immediately if she is or thinks she is pregnant.      See order report for IUD lot number:  [id number]

## 2019-08-09 LAB
HCG URINE, QL. (POC): NEGATIVE
VALID INTERNAL CONTROL?: YES

## 2019-09-05 ENCOUNTER — OFFICE VISIT (OUTPATIENT)
Dept: OBGYN CLINIC | Age: 39
End: 2019-09-05

## 2019-09-05 DIAGNOSIS — Z30.431 IUD CHECK UP: Primary | ICD-10-CM

## 2019-09-05 NOTE — PROGRESS NOTES
IUD followup note    This is a follow-up visit for Dickson Smyth is a [phone],  41 y.o. female [facility] No LMP recorded. .     She had an Mirena IUD placed four weeks ago. Since the IUD placement, the patient has not had any unusual complaints. She has had some mild non-menstrual bleeding. She describes having a light, spotting amount of blood-tinged discharge which has occurred off and on since insertion of the Mirena. She has had some cramping. She has had no fever. Associated signs and symptoms: she denies dyspareunia, expulsion, heavy bleeding, increased pain, fever, and pelvic pain. Ultrasound was not done today. Past Medical History:   Diagnosis Date    AR (allergic rhinitis)     Basal cell carcinoma of face 2002    left hairline    Infertility management      w dysmotility, not her issue    Infertility, female     IVF Pregnancy    Knee pain, bilateral     softball injuries    Miscarriage 12/2012    Pap smear 11/2009    Dr. Nara Galarza    Pap smear for cervical cancer screening 11/20/13 neg HPV NEG 12/28/18 neg HPV NEG    Recurrent UTI 6/3/2013     Past Surgical History:   Procedure Laterality Date    HX MALIGNANT SKIN LESION EXCISION  2002    basal cell     Social History     Occupational History    Not on file   Tobacco Use    Smoking status: Never Smoker    Smokeless tobacco: Never Used   Substance and Sexual Activity    Alcohol use:  Yes     Alcohol/week: 0.8 standard drinks     Types: 1 Standard drinks or equivalent per week     Comment: once a week    Drug use: No    Sexual activity: Yes     Partners: Male     Birth control/protection: None     Family History   Problem Relation Age of Onset    Cancer Father 48        pancreatic, dx 2/2009    Cancer Paternal Grandfather         cancer, unknown origin    Diabetes Maternal Grandmother     Hypertension Maternal Grandmother     Diabetes Paternal Grandmother     Hypertension Paternal Grandmother Allergies   Allergen Reactions    Latex Unknown (comments)     Prior to Admission medications    Medication Sig Start Date End Date Taking? Authorizing Provider   nitrofurantoin, macrocrystal-monohydrate, (MACROBID) 100 mg capsule Take 1 Cap by mouth as needed (dysuria). 7/11/19   Liza Bowman MD   topiramate (TOPAMAX) 25 mg tablet TAKE 1 TABLET BY MOUTH TWICE DAILY WITH MEALS 5/29/19   Arcenio Julio MD   phentermine (ADIPEX-P) 37.5 mg tablet Take 1/2 pill in AM and 1/2 in early afternoon 12/31/18   Kayli Michaels MD        Review of Systems: History obtained from the patient  Constitutional: negative for weight loss, fever, night sweats  Breast: negative for breast lumps, nipple discharge, galactorrhea  GI: negative for change in bowel habits, abdominal pain, black or bloody stools  : negative for frequency, dysuria, hematuria, vaginal discharge  MSK: negative for back pain, joint pain, muscle pain  Skin: negative for itching, rash, hives  Psych: negative for anxiety, depression, change in mood      Objective: There were no vitals taken for this visit.     Physical Exam:   PHYSICAL EXAMINATION    Constitutional  · Appearance: well-nourished, well developed, alert, in no acute distress    Gastrointestinal  · Abdominal Examination: abdomen non-tender to palpation, normal bowel sounds, no masses present  · Liver and spleen: no hepatomegaly present, spleen not palpable  · Hernias: no hernias identified    Genitourinary  · External Genitalia: normal appearance for age, no discharge present, no tenderness present, no inflammatory lesions present, no masses present, no atrophy present  · Vagina: normal vaginal vault without central or paravaginal defects, no discharge present, no inflammatory lesions present, no masses present  · Bladder: non-tender to palpation  · Urethra: appears normal  · Cervix: normal with IUD string visible and appropriate length   · Uterus: normal size, shape and consistency  · Adnexa: no adnexal tenderness present, no adnexal masses present  · Perineum: perineum within normal limits, no evidence of trauma, no rashes or skin lesions present    Skin  · General Inspection: no rash, no lesions identified    Neurologic/Psychiatric  · Mental Status:  · Orientation: grossly oriented to person, place and time  · Mood and Affect: mood normal, affect appropriate    Assessment:  Doing well with expected mild irregular bleeding. Plan:   RTO for AE as scheduled.

## 2019-09-05 NOTE — PROGRESS NOTES
IUD followup note    This is a follow-up visit for Genoveva Ospina is a G2 [phone],  41 y.o. female [facility] No LMP recorded. .     She had an Mirena IUD placed six weeks ago. Since the IUD placement, the patient has not had any unusual complaints. She has had some mild non-menstrual bleeding. She describes having a spotting amount of blood-tinged discharge which has occurred off and on since insertion of the Mirena. She has significant suprapubic pain. It is localized to the in midline, and does not  radiate. She has had no fever. Associated signs and symptoms: she denies dyspareunia, expulsion,  Fever. Past Medical History:   Diagnosis Date    AR (allergic rhinitis)     Basal cell carcinoma of face 2002    left hairline    Infertility management      w dysmotility, not her issue    Infertility, female     IVF Pregnancy    Knee pain, bilateral     softball injuries    Miscarriage 12/2012    Pap smear 11/2009    Dr. Trinh Farris    Pap smear for cervical cancer screening 11/20/13 neg HPV NEG 12/28/18 neg HPV NEG    Recurrent UTI 6/3/2013     Past Surgical History:   Procedure Laterality Date    HX MALIGNANT SKIN LESION EXCISION  2002    basal cell     Social History     Occupational History    Not on file   Tobacco Use    Smoking status: Never Smoker    Smokeless tobacco: Never Used   Substance and Sexual Activity    Alcohol use:  Yes     Alcohol/week: 0.8 standard drinks     Types: 1 Standard drinks or equivalent per week     Comment: once a week    Drug use: No    Sexual activity: Yes     Partners: Male     Birth control/protection: None     Family History   Problem Relation Age of Onset    Cancer Father 48        pancreatic, dx 2/2009    Cancer Paternal Grandfather         cancer, unknown origin    Diabetes Maternal Grandmother     Hypertension Maternal Grandmother     Diabetes Paternal Grandmother     Hypertension Paternal Grandmother        Allergies   Allergen Reactions    Latex Unknown (comments)     Prior to Admission medications    Medication Sig Start Date End Date Taking? Authorizing Provider   nitrofurantoin, macrocrystal-monohydrate, (MACROBID) 100 mg capsule Take 1 Cap by mouth as needed (dysuria). 7/11/19   Angy Sheehan MD   topiramate (TOPAMAX) 25 mg tablet TAKE 1 TABLET BY MOUTH TWICE DAILY WITH MEALS 5/29/19   Deysi Julio MD   phentermine (ADIPEX-P) 37.5 mg tablet Take 1/2 pill in AM and 1/2 in early afternoon 12/31/18   Hesham Chen MD        Review of Systems: History obtained from the patient  Constitutional: negative for weight loss, fever, night sweats  Breast: negative for breast lumps, nipple discharge, galactorrhea  GI: negative for change in bowel habits, abdominal pain, black or bloody stools  : negative for frequency, dysuria, hematuria, vaginal discharge  MSK: negative for back pain, joint pain, muscle pain  Skin: negative for itching, rash, hives  Psych: negative for anxiety, depression, change in mood      Objective: There were no vitals taken for this visit.     Physical Exam:   PHYSICAL EXAMINATION    Constitutional  · Appearance: well-nourished, well developed, alert, in no acute distress    Gastrointestinal  · Abdominal Examination: abdomen non-tender to palpation, normal bowel sounds, no masses present  · Liver and spleen: no hepatomegaly present, spleen not palpable  · Hernias: no hernias identified    Genitourinary  · External Genitalia: normal appearance for age, no discharge present, no tenderness present, no inflammatory lesions present, no masses present, no atrophy present  · Vagina: normal vaginal vault without central or paravaginal defects, no discharge present, no inflammatory lesions present, no masses present  · Bladder: non-tender to palpation  · Urethra: appears normal  · Cervix: normal with IUD string visible and appropriate length   · Uterus: normal size, shape and consistency  · Adnexa: no adnexal tenderness present, no adnexal masses present  · Perineum: perineum within normal limits, no evidence of trauma, no rashes or skin lesions present    Skin  · General Inspection: no rash, no lesions identified    Neurologic/Psychiatric  · Mental Status:  · Orientation: grossly oriented to person, place and time  · Mood and Affect: mood normal, affect appropriate    Assessment:  Doing well with expected mild irregular bleeding. Plan:   RTO for AE as scheduled.

## 2019-10-09 ENCOUNTER — OFFICE VISIT (OUTPATIENT)
Dept: INTERNAL MEDICINE CLINIC | Age: 39
End: 2019-10-09

## 2019-10-09 VITALS
SYSTOLIC BLOOD PRESSURE: 128 MMHG | RESPIRATION RATE: 18 BRPM | TEMPERATURE: 98.1 F | HEIGHT: 69 IN | BODY MASS INDEX: 31.7 KG/M2 | DIASTOLIC BLOOD PRESSURE: 86 MMHG | HEART RATE: 82 BPM | OXYGEN SATURATION: 99 % | WEIGHT: 214 LBS

## 2019-10-09 DIAGNOSIS — E66.9 OBESITY (BMI 30.0-34.9): ICD-10-CM

## 2019-10-09 DIAGNOSIS — Z23 ENCOUNTER FOR IMMUNIZATION: ICD-10-CM

## 2019-10-09 DIAGNOSIS — N93.8 DUB (DYSFUNCTIONAL UTERINE BLEEDING): ICD-10-CM

## 2019-10-09 DIAGNOSIS — R63.5 WEIGHT GAIN: Primary | ICD-10-CM

## 2019-10-09 RX ORDER — TOPIRAMATE 25 MG/1
TABLET ORAL
Qty: 60 TAB | Refills: 2 | Status: SHIPPED | OUTPATIENT
Start: 2019-10-09 | End: 2020-07-01

## 2019-10-09 RX ORDER — PHENTERMINE HYDROCHLORIDE 37.5 MG/1
TABLET ORAL
Qty: 30 TAB | Refills: 2 | Status: SHIPPED | OUTPATIENT
Start: 2019-10-09 | End: 2020-03-30 | Stop reason: SDUPTHER

## 2019-10-10 NOTE — PROGRESS NOTES
[facility]ov[facility] Mickey Sanabria in GYN for DUB. Has mirena. Still bleeding. She is irritable at times. Presents for follow-up of weight management. She was tolerating phentermine 1/2 pill in AM only and topamax 25 mg hs only. Can not tolerate taking either med bid. Lost 40 lb since 3/2018  Stopped meds 5/2019  Medications help with cravings  Was on keto diet  Wt Readings from Last 3 Encounters:   10/09/19 214 lb (97.1 kg)   08/07/19 211 lb 3.2 oz (95.8 kg)   12/28/18 193 lb (87.5 kg)   her 193 lb 12/2018 was her lowest weight on our records    Review of Systems   All other systems reviewed and are negative, except as noted in HPI    Past Medical and Surgical History   has a past medical history of AR (allergic rhinitis), Basal cell carcinoma of face (2002), DUB (dysfunctional uterine bleeding) (2019), Elevated HDL, Infertility management, Knee pain, bilateral, Miscarriage (12/2012), Pap smear (11/2009), Pap smear for cervical cancer screening (11/20/13 neg HPV NEG 12/28/18 neg HPV NEG), and Recurrent UTI (6/3/2013). She also has no past medical history of Abnormal Pap smear, Anemia, Asthma, Chlamydia, Complication of anesthesia, Diabetes ([address] Essential hypertension, Genital herpes, unspecified, Gonorrhea, Heart abnormality, Herpes gestationis, Herpes simplex without mention of complication, Human immunodeficiency virus (HIV) disease ([address] OTHER MEDICAL, Kidney disease, Liver disease, Phlebitis and thrombophlebitis of unspecified site, Pituitary disorder ([address] Polycystic disease, ovaries, Postpartum depression, Psychiatric problem, Rhesus isoimmunization unspecified as to episode of care in pregnancy, Sickle-cell disease, unspecified, Syphilis, Systemic lupus erythematosus ([address] Thyroid activity decreased, Trauma, Unspecified breast disorder, Unspecified diseases of blood and blood-forming organs, or Unspecified epilepsy without mention of intractable epilepsy.    has a past surgical history that includes hx malignant skin lesion excision (2002). reports that she has never smoked. She has never used smokeless tobacco. She reports that she drinks about 0.8 standard drinks of alcohol per week. She reports that she does not use drugs. family history includes Cancer in her paternal grandfather; Cancer (age of onset: 48) in her father; Diabetes in her maternal grandmother and paternal grandmother; Hypertension in her maternal grandmother and paternal grandmother. Physical Exam   Nursing note and vitals reviewed. Blood pressure 128/86, pulse 82, temperature 98.1 °F (36.7 °C), temperature source Oral, resp. rate 18, height 5' 9\" (1.753 m), weight 214 lb (97.1 kg), SpO2 99 %. Constitutional:  No distress. Eyes: Conjunctivae are normal.   Ears:  Hearing grossly intact  Cardiovascular: Normal rate. regular rhythm, no murmurs or gallops  No edema  Pulmonary/Chest: Effort normal.   CTAB  Musculoskeletal: moves all 4 extremities   Neurological: Alert and oriented to person, place, and time. Skin: No rash noted. Psychiatric: Normal mood and affect. Behavior is normal.     Diagnoses and all orders for this visit:    1. Weight gain  The patient is asked to make an attempt to improve diet and exercise patterns to aid in medical management of this problem. Resume meds    2. DUB (dysfunctional uterine bleeding)    3. Obesity (BMI 30.0-34.9)  -     topiramate (TOPAMAX) 25 mg tablet; TAKE 1 TABLET BY MOUTH TWICE DAILY WITH MEALS  -     phentermine (ADIPEX-P) 37.5 mg tablet; Take 1/2 pill in AM and 1/2 in early afternoon    4.  Encounter for immunization  -     INFLUENZA VIRUS VAC QUAD,SPLIT,PRESV FREE SYRINGE IM  -     NM IMMUNIZ ADMIN,1 SINGLE/COMB VAC/TOXOID            lab results and schedule of future lab studies reviewed with patient  reviewed medications and side effects in detail  Return to clinic for further evaluation if new symptoms develop    Current Outpatient Medications Medication Sig    levonorgestrel (MIRENA) 20 mcg/24 hours (5 yrs) 52 mg IUD 1 Device by IntraUTERine route once for 1 dose.  topiramate (TOPAMAX) 25 mg tablet TAKE 1 TABLET BY MOUTH TWICE DAILY WITH MEALS    phentermine (ADIPEX-P) 37.5 mg tablet Take 1/2 pill in AM and 1/2 in early afternoon    nitrofurantoin, macrocrystal-monohydrate, (MACROBID) 100 mg capsule Take 1 Cap by mouth as needed (dysuria). No current facility-administered medications for this visit.

## 2020-01-29 ENCOUNTER — OFFICE VISIT (OUTPATIENT)
Dept: OBGYN CLINIC | Age: 40
End: 2020-01-29

## 2020-01-29 DIAGNOSIS — Z01.419 ENCOUNTER FOR GYNECOLOGICAL EXAMINATION WITHOUT ABNORMAL FINDING: Primary | ICD-10-CM

## 2020-01-29 NOTE — PROGRESS NOTES
[facility]  OFFICE PROCEDURE PROGRESS NOTE        Chart reviewed for the following:   Dale LOUISE, have reviewed the History, Physical and updated the Allergic reactions for Tulio Mendoza performed immediately prior to start of procedure:   Dale LOUISE, have performed the following reviews on Vira Avitia Jodee prior to the start of the procedure:            * Patient was identified by name and date of birth   * Agreement on procedure being performed was verified  * Risks and Benefits explained to the patient  * Procedure site verified and marked as necessary  * Patient was positioned for comfort  * Consent was signed and verified     Time: 110      Date of procedure: 2020    Procedure performed by:  Nisreen Villarreal MD    Provider assisted by: [facility]    Patient assisted by: self    How tolerated by patient: tolerated the procedure well with no complications    Post Procedural Pain Scale: 0 - No Hurt    Comments: none  IUD REMOVAL  Indications for Removal:  Jerrod Lin is a ,  42 y.o. female [facility] whose No LMP recorded. . who presents today for IUD removal. Her current IUD was placed 5 months ago. She has had problems with the IUD. She requests removal of the IUD because of DUB. The IUD removal procedure was discussed with the patient and she had no further questions. Procedure: The patient was placed in a dorsal lithotomy position and appropriately draped. On bimanual exam the uterus was anterior and normal in size with no tenderness present. A speculum exam was performed and the cervix was visualized. The cervix was prepped with zephiran solution. The IUD string was visualized. Using ring forceps , the string was grasped and the IUD removed intact. The IUD was shown to the patient.

## 2020-02-12 ENCOUNTER — OFFICE VISIT (OUTPATIENT)
Dept: OBGYN CLINIC | Age: 40
End: 2020-02-12

## 2020-02-12 ENCOUNTER — HOSPITAL ENCOUNTER (OUTPATIENT)
Dept: LAB | Age: 40
Discharge: HOME | End: 2020-02-12

## 2020-02-12 DIAGNOSIS — N93.8 DUB (DYSFUNCTIONAL UTERINE BLEEDING): Primary | ICD-10-CM

## 2020-02-12 LAB
HCG URINE, QL. (POC): NEGATIVE
VALID INTERNAL CONTROL?: YES

## 2020-02-12 NOTE — PROGRESS NOTES
Ultrasound followup    Rosa Nice is a 42 y.o. female is here today to review the results of her ultrasound evaluation. Her U/S evaluation is performed because of a previous encounter revealing DUB which was identified several months ago. She is here for an initial ultrasound study. The sonogram: within normal limits. See detailed report for more information. Past Medical History:   Diagnosis Date    AR (allergic rhinitis)     Basal cell carcinoma of face 2002    left hairline    DUB (dysfunctional uterine bleeding) 2019    Dr. Serafin Stanton Elevated HDL     Infertility management      w dysmotility. she had IVF.  Knee pain, bilateral     softball injuries    Miscarriage 12/2012    Pap smear 11/2009    Dr. Macarena Hills    Pap smear for cervical cancer screening 11/20/13 neg HPV NEG 12/28/18 neg HPV NEG    Recurrent UTI 6/3/2013     Past Surgical History:   Procedure Laterality Date    HX MALIGNANT SKIN LESION EXCISION  2002    basal cell     Social History     Occupational History    Occupation: . Was [employer].  twin son and daughter born 2014. Employer: NOT EMPLOYED   Tobacco Use    Smoking status: Never Smoker    Smokeless tobacco: Never Used   Substance and Sexual Activity    Alcohol use:  Yes     Alcohol/week: 0.8 standard drinks     Types: 1 Standard drinks or equivalent per week     Comment: once a week    Drug use: No    Sexual activity: Yes     Partners: Male     Birth control/protection: None     Family History   Problem Relation Age of Onset    Cancer Father 48        pancreatic, dx 2/2009    Cancer Paternal Grandfather         cancer, unknown origin    Diabetes Maternal Grandmother     Hypertension Maternal Grandmother     Diabetes Paternal Grandmother     Hypertension Paternal Grandmother        Allergies   Allergen Reactions    Latex Unknown (comments)     Prior to Admission medications    Medication Sig Start Date End Date Taking? Authorizing Provider   topiramate (TOPAMAX) 25 mg tablet TAKE 1 TABLET BY MOUTH TWICE DAILY WITH MEALS 10/9/19   Thu Julio MD   phentermine (ADIPEX-P) 37.5 mg tablet Take 1/2 pill in AM and 1/2 in early afternoon 10/9/19   Thu Julio MD   nitrofurantoin, macrocrystal-monohydrate, (MACROBID) 100 mg capsule Take 1 Cap by mouth as needed (dysuria). 7/11/19   Chava Cruz MD        Review of Systems: History obtained from the patient  Constitutional: negative for weight loss, fever, night sweats  Breast: negative for breast lumps, nipple discharge, galactorrhea  GI: negative for change in bowel habits, abdominal pain, black or bloody stools  : negative for frequency, dysuria, hematuria, vaginal discharge  MSK: negative for back pain, joint pain, muscle pain  Skin: negative for itching, rash, hives  Psych: negative for anxiety, depression, change in mood      Objective: There were no vitals taken for this visit.     Physical Exam:   PHYSICAL EXAMINATION    Constitutional  · Appearance: well-nourished, well developed, alert, in no acute distress    Gastrointestinal  · Abdominal Examination: abdomen non-tender to palpation, normal bowel sounds, no masses present  · Liver and spleen: no hepatomegaly present, spleen not palpable  · Hernias: no hernias identified    Genitourinary  · External Genitalia: normal appearance for age, no discharge present, no tenderness present, no inflammatory lesions present, no masses present, no atrophy present  · Vagina: normal vaginal vault without central or paravaginal defects, no discharge present, no inflammatory lesions present, no masses present  · Bladder: non-tender to palpation  · Urethra: appears normal  · Cervix: normal   · Uterus: normal size, shape and consistency  · Adnexa: no adnexal tenderness present, no adnexal masses present  · Perineum: perineum within normal limits, no evidence of trauma, no rashes or skin lesions present  · Anus: anus within normal limits, no hemorrhoids present  · Inguinal Lymph Nodes: no lymphadenopathy present    Skin  · General Inspection: no rash, no lesions identified    Neurologic/Psychiatric  · Mental Status:  · Orientation: grossly oriented to person, place and time  · Mood and Affect: mood normal, affect appropriate      ASSESSMENT:    ICD-10-CM ICD-9-CM    1. DUB (dysfunctional uterine bleeding) N93.8 626.8 AMB POC URINE PREGNANCY TEST, VISUAL COLOR COMPARISON      SURGICAL PATHOLOGY   -thickened endometrium- will post for hysteroscopy/D&C/Novasure/Possible Polypectomy with Myosure. PLAN:  Orders Placed This Encounter    AMB POC URINE PREGNANCY TEST, VISUAL COLOR COMPARISON    SURGICAL PATHOLOGY     Standing Status:   Future     Standing Expiration Date:   8/12/2020     Order Specific Question:   Type of Specimen and Locations? Answer:   endometrial bx       RTO prn if symptoms persist or worsen. Instructions given to pt. Handouts given to pt.       [facility]  OFFICE PROCEDURE PROGRESS NOTE        Chart reviewed for the following:   Jihan LOUISE, have reviewed the History, Physical and updated the Allergic reactions for [facility] performed immediately prior to start of procedure:   Jihan LOUISE, have performed the following reviews on Vira Asif [facility] prior to the start of the procedure:            * Patient was identified by name and date of birth   * Agreement on procedure being performed was verified  * Risks and Benefits explained to the patient  * Procedure site verified and marked as necessary  * Patient was positioned for comfort  * Consent was signed and verified     Time: 1040      Date of procedure: 2/12/2020    Procedure performed by:  Stef Padilla MD    Provider assisted by: [facility] [place]    Patient assisted by: self    How tolerated by patient: tolerated the procedure well with no complications    Post Procedural Pain Scale: 2 - Nona Borges Bit    Comments: none        Procedure note: Endometrial biopsy    Ira Corey is a ,  42 y.o. female [place]  No LMP recorded. The patient has a history of There were no encounter diagnoses. and presents for an endometrial biopsy. Indications:   After the indications, risks, benefits, and alternatives to performing an endometrial biopsy were explained to the patient, her questions were answered and informed consent was obtained. Procedure: The patient was placed on the table in the dorsal lithotomy position. A bimanual exam showed the uterus to be anterior. The uterus was not enlarged. A speculum was placed in the vagina. The cervix was visualized and prepped with zephrin. A tenaculum was placed on the anterior lip of the cervix for traction. It was not necessary to dilate the cervix. A pipelle was passed through the endocervical canal without difficulty. The uterus was sounded to 7 cm's. A moderate amount of tissue was returned. This tissue was placed in formalin and sent to pathology. It was felt that an adequate sample was obtained. The patient tolerated the procedure well and she reported mild cramping. The tenaculum and speculum were removed. Post Procedural Status: The patient was observed for 10 minutes after the procedure. She had mild cramping at the time of discharge. There were no complications. The patient was discharged in stable condition.

## 2020-02-24 DIAGNOSIS — N93.8 DUB (DYSFUNCTIONAL UTERINE BLEEDING): Primary | ICD-10-CM

## 2020-03-30 DIAGNOSIS — E66.9 OBESITY (BMI 30.0-34.9): ICD-10-CM

## 2020-03-30 RX ORDER — PHENTERMINE HYDROCHLORIDE 37.5 MG/1
TABLET ORAL
Qty: 30 TAB | Refills: 2 | Status: SHIPPED | OUTPATIENT
Start: 2020-03-30 | End: 2022-01-27 | Stop reason: SDUPTHER

## 2020-05-04 DIAGNOSIS — N93.8 DUB (DYSFUNCTIONAL UTERINE BLEEDING): Primary | ICD-10-CM

## 2020-10-01 DIAGNOSIS — E66.9 OBESITY (BMI 30.0-34.9): ICD-10-CM

## 2020-10-01 RX ORDER — TOPIRAMATE 25 MG/1
TABLET ORAL
Qty: 180 TAB | Refills: 1 | Status: SHIPPED | OUTPATIENT
Start: 2020-10-01 | End: 2022-01-27 | Stop reason: SDUPTHER

## 2020-11-03 ENCOUNTER — TELEPHONE (OUTPATIENT)
Dept: INTERNAL MEDICINE CLINIC | Age: 40
End: 2020-11-03

## 2020-11-03 NOTE — TELEPHONE ENCOUNTER
Updated flu vaccination info in the patient's chart.     Mitzi Williamson, PharmD  PGY1 Resident  [facility]

## 2021-11-30 ENCOUNTER — TELEPHONE (OUTPATIENT)
Dept: INTERNAL MEDICINE CLINIC | Age: 41
End: 2021-11-30

## 2021-11-30 NOTE — TELEPHONE ENCOUNTER
----- Message from Ritchie Sevilla sent at 11/30/2021 10:30 AM EST -----  Subject: Referral Request    QUESTIONS   Reason for referral request? blood work   Has the physician seen you for this condition before? No   Preferred Specialist (if applicable)? Do you already have an appointment scheduled? Additional Information for Provider?   ---------------------------------------------------------------------------  --------------  CALL BACK INFO  What is the best way for the office to contact you? OK to leave message on   voicemail  Preferred Call Back Phone Number?  [phone]

## 2021-12-01 ENCOUNTER — TELEPHONE (OUTPATIENT)
Dept: INTERNAL MEDICINE CLINIC | Age: 41
End: 2021-12-01

## 2021-12-01 NOTE — TELEPHONE ENCOUNTER
Spoke with patient and she states that she was just calling to schedule an appointment for physical and that on was made for 1/27/2022 at 10:00 AM. She was wondering about blood work and advised that Dr. Hayden Sanderson will order the appropriate blood work when he sees her in January. Patient grateful for the call.

## 2022-01-27 ENCOUNTER — HOSPITAL ENCOUNTER (OUTPATIENT)
Dept: GENERAL RADIOLOGY | Age: 42
Discharge: HOME | End: 2022-01-27

## 2022-01-27 ENCOUNTER — OFFICE VISIT (OUTPATIENT)
Dept: INTERNAL MEDICINE CLINIC | Age: 42
End: 2022-01-27
Payer: COMMERCIAL

## 2022-01-27 VITALS
HEIGHT: 69 IN | SYSTOLIC BLOOD PRESSURE: 132 MMHG | TEMPERATURE: 98.4 F | DIASTOLIC BLOOD PRESSURE: 89 MMHG | HEART RATE: 94 BPM | OXYGEN SATURATION: 99 % | BODY MASS INDEX: 39.16 KG/M2 | RESPIRATION RATE: 16 BRPM | WEIGHT: 264.4 LBS

## 2022-01-27 DIAGNOSIS — E66.9 OBESITY (BMI 30.0-34.9): ICD-10-CM

## 2022-01-27 DIAGNOSIS — D22.39 ATYPICAL NEVUS OF CHEEK: ICD-10-CM

## 2022-01-27 DIAGNOSIS — M79.18 MYOFASCIAL PAIN: ICD-10-CM

## 2022-01-27 DIAGNOSIS — M54.2 NECK PAIN ON LEFT SIDE: Primary | ICD-10-CM

## 2022-01-27 DIAGNOSIS — M54.2 NECK PAIN ON LEFT SIDE: ICD-10-CM

## 2022-01-27 PROCEDURE — 99214 OFFICE O/P EST MOD 30 MIN: CPT | Performed by: INTERNAL MEDICINE

## 2022-01-27 RX ORDER — PHENTERMINE HYDROCHLORIDE 37.5 MG/1
TABLET ORAL
Qty: 90 TABLET | Refills: 1 | Status: SHIPPED | OUTPATIENT
Start: 2022-01-27

## 2022-01-27 RX ORDER — MELOXICAM 7.5 MG/1
7.5 TABLET ORAL DAILY
Qty: 30 TABLET | Refills: 2 | Status: SHIPPED | OUTPATIENT
Start: 2022-01-27

## 2022-01-27 RX ORDER — TOPIRAMATE 25 MG/1
25 TABLET ORAL
Qty: 90 TABLET | Refills: 1 | Status: SHIPPED | OUTPATIENT
Start: 2022-01-27 | End: 2022-08-17

## 2022-01-27 RX ORDER — PANTOPRAZOLE SODIUM 20 MG/1
20 TABLET, DELAYED RELEASE ORAL DAILY
Qty: 20 TABLET | Refills: 1 | Status: SHIPPED | OUTPATIENT
Start: 2022-01-27

## 2022-01-27 RX ORDER — TIZANIDINE 2 MG/1
2 TABLET ORAL
Qty: 30 TABLET | Refills: 2 | Status: SHIPPED | OUTPATIENT
Start: 2022-01-27

## 2022-01-27 RX ORDER — PREDNISONE 10 MG/1
TABLET ORAL
Qty: 38 TABLET | Refills: 0 | Status: SHIPPED | OUTPATIENT
Start: 2022-01-27 | End: 2022-02-10

## 2022-01-27 NOTE — PROGRESS NOTES
HISTORY OF PRESENT ILLNESS    Chief Complaint   Patient presents with    Weight Management     Hormones.  Neck Pain     8/2020 was seen in hospital for a neck sprain on the left side - still has issues with certain movements    Labs     Nonfasting. Presents for follow-up    Left-sided neck pain. Neck injury 8/2020. Onset when lifting daughter. Was seen in ER at the time and given medrol, cyclobenzapine, percocet. At onset, it hurt to move neck, head and left arm, but no radiation of pain or weakness of left upper or lower extremity/   Reports left neck pain flares since that time which can be debilitating. Taking ibuprofen which usually helps. CBD also can help. No imaging of neck is ever been done. Recent R achilles injury. This is doing better. Weight gain. Intermittent dieting. Now is doing carb cycling diet, circuit training  History of obesity. She did well with phentermine and had some brain fog on topiramate and could not tolerate it twice daily. Wt Readings from Last 3 Encounters:   01/27/22 264 lb 6.4 oz (119.9 kg)   10/09/19 214 lb (97.1 kg)   08/07/19 211 lb 3.2 oz (95.8 kg)     Labs done by online provider 9/16/21. FSH 18.3 (high), free T3 2.8, free T4 0.9, TSH 1.3, cortisol salivary normal, DHEAS 1.7   Estradiol 1.3, progesterone 28 (low), Testosterone 21  Menses are irregular. Was going to have uterine ablation, but deferred. She reports hot flashes, night sweats. Review of Systems   All other systems reviewed and are negative, except as noted in HPI    Past Medical and Surgical History   has a past medical history of AR (allergic rhinitis), Basal cell carcinoma of face (2002), DUB (dysfunctional uterine bleeding) (2019), Elevated HDL, Infertility management, Knee pain, bilateral, Miscarriage (12/2012), Pap smear (11/2009), Pap smear for cervical cancer screening (11/20/13 neg HPV NEG 12/28/18 neg HPV NEG), and Recurrent UTI (6/3/2013). She has no past medical history of Abnormal Pap smear, Anemia, Asthma, Chlamydia, Complication of anesthesia, Diabetes ([address] Essential hypertension, Genital herpes, unspecified, Gonorrhea, Heart abnormality, Herpes gestationis, Herpes simplex without mention of complication, Human immunodeficiency virus (HIV) disease ([address] OTHER MEDICAL, Kidney disease, Liver disease, Phlebitis and thrombophlebitis of unspecified site, Pituitary disorder ([address] Polycystic disease, ovaries, Postpartum depression, Psychiatric problem, Rhesus isoimmunization unspecified as to episode of care in pregnancy, Sickle-cell disease, unspecified, Syphilis, Systemic lupus erythematosus ([address] Thyroid activity decreased, Trauma, Unspecified breast disorder, Unspecified diseases of blood and blood-forming organs, or Unspecified epilepsy without mention of intractable epilepsy. has a past surgical history that includes hx malignant skin lesion excision (2002). reports that she has never smoked. She has never used smokeless tobacco. She reports current alcohol use of about 0.8 standard drinks of alcohol per week. She reports that she does not use drugs. family history includes Cancer in her paternal grandfather; Cancer (age of onset: 48) in her father; Diabetes in her maternal grandmother and paternal grandmother; Hypertension in her maternal grandmother and paternal grandmother. Physical Exam   Nursing note and vitals reviewed. Blood pressure 132/89, pulse 94, temperature 98.4 °F (36.9 °C), temperature source Oral, resp. rate 16, height 5' 9\" (1.753 m), weight 264 lb 6.4 oz (119.9 kg), last menstrual period 12/29/2021, SpO2 99 %. Constitutional:  No distress. Eyes: Conjunctivae are normal.   Ears:  Hearing grossly intact  Cardiovascular: Normal rate. regular rhythm, no murmurs or gallops  No edema  Pulmonary/Chest: Effort normal.   CTAB  Musculoskeletal: moves all 4 extremities   Neck full range of motion with no point tenderness in cervical spine.   She does have some paraspinal muscle tenderness and tightness. Full range of motion to shoulders, arms and time out of 5 strength upper extremities. Neurological: Alert and oriented to person, place, and time. Skin: No visible rash noted. Psychiatric: Normal mood and affect. Behavior is normal.     Assessment and Plan    Diagnoses and all orders for this visit:    1. Neck pain on left side  2. Myofascial pain  Ongoing recurrent neck pain for the past year and a half. Sometimes it is moderately disabling. No radicular symptoms or weakness. This is very likely muscle and myofascial strain. I think it is reasonable to rule out any obvious structural abnormalities with an x-ray, but could defer that until after physical therapy. When pain flares, it is reasonable to try a cocktail of NSAID, muscle relaxant and/or prednisone and I have given prescriptions for these. In general, stretching exercises should be used and could consider topical therapy such as Biofreeze. Primary treatment modality is physical therapy and possible dry needling. I have referred her to a place that her  knows but she was not sure the name. I am happy to sign any additional forms if needed. -     XR SPINE CERV 4 OR 5 V; Future  -     meloxicam (MOBIC) 7.5 mg tablet; Take 1 Tablet by mouth daily. -     tiZANidine (ZANAFLEX) 2 mg tablet; Take 1 Tablet by mouth three (3) times daily as needed for Muscle Spasm(s). -     predniSONE (DELTASONE) 10 mg tablet; Take 4 tabs daily for 4 days, then 3 tab for 4 days, then 2 tab for 4 days, then 1 tab for 2 days  -     REFERRAL TO PHYSICAL THERAPY    3. Obesity BMI 39  Significant weight gain with obesity. She is on a good track with diet and increased exercise as much as she can tolerate with her neck.   I do recommend resuming medication therapy which has worked well in the past.  Could consider other treatments such as we will go be if covered by insurance but that is probably a problem. -     topiramate (TOPAMAX) 25 mg tablet; Take 1 Tablet by mouth nightly. -     phentermine (ADIPEX-P) 37.5 mg tablet; Take 1/2 pill in AM and 1/2 pill in afternoon  Indications: weight loss management for an obese person    4. Atypical nevus of cheek  She has an atypical nevus of her cheek which is mildly dark and perhaps a little indented. I think basal cell carcinoma needs to be considered. She will consult with dermatology. History of previous basal cell. -     REFERRAL TO DERMATOLOGY    Other orders  -     pantoprazole (PROTONIX) 20 mg tablet; Take 1 Tablet by mouth daily. As needed while on meloxicam    lab results and schedule of future lab studies reviewed with patient  reviewed medications and side effects in detail    Return to clinic for further evaluation if new symptoms develop        Current Outpatient Medications   Medication Sig    mv-min/iron/folic/calcium/vitK (WOMEN'S MULTIVITAMIN PO) Take  by mouth daily.  meloxicam (MOBIC) 7.5 mg tablet Take 1 Tablet by mouth daily.  tiZANidine (ZANAFLEX) 2 mg tablet Take 1 Tablet by mouth three (3) times daily as needed for Muscle Spasm(s).  predniSONE (DELTASONE) 10 mg tablet Take 4 tabs daily for 4 days, then 3 tab for 4 days, then 2 tab for 4 days, then 1 tab for 2 days    topiramate (TOPAMAX) 25 mg tablet Take 1 Tablet by mouth nightly.  phentermine (ADIPEX-P) 37.5 mg tablet Take 1/2 pill in AM and 1/2 pill in afternoon  Indications: weight loss management for an obese person    pantoprazole (PROTONIX) 20 mg tablet Take 1 Tablet by mouth daily. As needed while on meloxicam     No current facility-administered medications for this visit.

## 2022-03-01 ENCOUNTER — OFFICE VISIT (OUTPATIENT)
Dept: OBGYN CLINIC | Age: 42
End: 2022-03-01
Payer: COMMERCIAL

## 2022-03-01 VITALS — SYSTOLIC BLOOD PRESSURE: 125 MMHG | WEIGHT: 254.8 LBS | DIASTOLIC BLOOD PRESSURE: 83 MMHG | BODY MASS INDEX: 37.63 KG/M2

## 2022-03-01 DIAGNOSIS — Z80.9 FAMILY HISTORY OF CANCER: ICD-10-CM

## 2022-03-01 DIAGNOSIS — Z01.419 ENCOUNTER FOR GYNECOLOGICAL EXAMINATION WITHOUT ABNORMAL FINDING: Primary | ICD-10-CM

## 2022-03-01 DIAGNOSIS — Z80.3 FAMILY HISTORY OF BREAST CANCER: ICD-10-CM

## 2022-03-01 DIAGNOSIS — N92.6 MISSED MENSES: ICD-10-CM

## 2022-03-01 PROCEDURE — 99395 PREV VISIT EST AGE 18-39: CPT | Performed by: OBSTETRICS & GYNECOLOGY

## 2022-03-01 RX ORDER — ESTRADIOL AND LEVONORGESTREL .045; .015 MG/D; MG/D
1 PATCH TRANSDERMAL
Qty: 12 PATCH | Refills: 4 | Status: SHIPPED | OUTPATIENT
Start: 2022-03-05 | End: 2022-03-04

## 2022-03-01 NOTE — PROGRESS NOTES
Annual exam ages 23-44    Geovani Camarena is a ,  46 y.o. female   Patient's last menstrual period was 2021. She presents for her annual checkup. She is having concerns about having a cycle every other month. pt c/o hot flashes that started about 5 months ago. .    Outside hormone levels reviewed- low estrogen, high FSH    Menstrual status:    Her periods are normal in flow. She is using three to ten pads or tampons per day, often irregular with no apparent pattern. She does not have dysmenorrhea. She reports no premenstrual symptoms. Contraception:    The current method of family planning is none. Sexual history:    She  reports being sexually active and has had partner(s) who are male. She reports using the following method of birth control/protection: None. Medical conditions:    Since her last annual GYN exam about two years ago, she has not the following changes in her health history: none. Surgical history confirmed with patient. has a past surgical history that includes hx malignant skin lesion excision (). Pap and Mammogram History:    Her most recent Pap smear was normal, obtained 4 year(s) ago. The patient has never had a mammogram.    Breast Cancer History/Substance Abuse: negative    Past Medical History:   Diagnosis Date    AR (allergic rhinitis)     Basal cell carcinoma of face     left hairline    DUB (dysfunctional uterine bleeding)     Dr. Ce Godoy Elevated HDL     Infertility management      w dysmotility. she had IVF.      Knee pain, bilateral     softball injuries    Miscarriage 2012    Pap smear 2009    Dr. Junior Blevins    Pap smear for cervical cancer screening 13 neg HPV NEG 18 neg HPV NEG    Recurrent UTI 6/3/2013     Past Surgical History:   Procedure Laterality Date    HX MALIGNANT SKIN LESION EXCISION      basal cell       Current Outpatient Medications   Medication Sig Dispense Refill  mv-min/iron/folic/calcium/vitK (WOMEN'S MULTIVITAMIN PO) Take  by mouth daily.  topiramate (TOPAMAX) 25 mg tablet Take 1 Tablet by mouth nightly. 90 Tablet 1    phentermine (ADIPEX-P) 37.5 mg tablet Take 1/2 pill in AM and 1/2 pill in afternoon  Indications: weight loss management for an obese person 90 Tablet 1    meloxicam (MOBIC) 7.5 mg tablet Take 1 Tablet by mouth daily. (Patient not taking: Reported on 3/1/2022) 30 Tablet 2    tiZANidine (ZANAFLEX) 2 mg tablet Take 1 Tablet by mouth three (3) times daily as needed for Muscle Spasm(s). (Patient not taking: Reported on 3/1/2022) 30 Tablet 2    pantoprazole (PROTONIX) 20 mg tablet Take 1 Tablet by mouth daily. As needed while on meloxicam (Patient not taking: Reported on 3/1/2022) 20 Tablet 1     Allergies: Latex     Tobacco History:  reports that she has never smoked. She has never used smokeless tobacco.  Alcohol Abuse:  reports current alcohol use of about 0.8 standard drinks of alcohol per week. Drug Abuse:  reports no history of drug use.     Family Medical/Cancer History:   Family History   Problem Relation Age of Onset    Cancer Father 48        pancreatic, dx 2/2009    Cancer Paternal Grandfather         cancer, unknown origin    Diabetes Maternal Grandmother     Hypertension Maternal Grandmother     Diabetes Paternal Grandmother     Hypertension Paternal Grandmother         Review of Systems - History obtained from the patient  Constitutional: negative for weight loss, fever, night sweats  HEENT: negative for hearing loss, earache, congestion, snoring, sorethroat  CV: negative for chest pain, palpitations, edema  Resp: negative for cough, shortness of breath, wheezing  GI: negative for change in bowel habits, abdominal pain, black or bloody stools  : negative for frequency, dysuria, hematuria, vaginal discharge  MSK: negative for back pain, joint pain, muscle pain  Breast: negative for breast lumps, nipple discharge, galactorrhea  Skin :negative for itching, rash, hives  Neuro: negative for dizziness, headache, confusion, weakness  Psych: negative for anxiety, depression, change in mood  Heme/lymph: negative for bleeding, bruising, pallor    Physical Exam    Visit Vitals  /83   Wt 254 lb 12.8 oz (115.6 kg)   LMP 12/29/2021   BMI 37.63 kg/m²       Constitutional  · Appearance: well-nourished, well developed, alert, in no acute distress    HENT  · Head and Face: appears normal    Neck  · Inspection/Palpation: normal appearance, no masses or tenderness  · Lymph Nodes: no lymphadenopathy present  · Thyroid: gland size normal, nontender, no nodules or masses present on palpation    Chest  · Respiratory Effort: breathing unlabored    Breasts  · Inspection of Breasts: breasts symmetrical, no skin changes, no discharge present, nipple appearance normal, no skin retraction present  · Palpation of Breasts and Axillae: no masses present on palpation, no breast tenderness  · Axillary Lymph Nodes: no lymphadenopathy present    Gastrointestinal  · Abdominal Examination: abdomen non-tender to palpation, normal bowel sounds, no masses present  · Liver and spleen: no hepatomegaly present, spleen not palpable  · Hernias: no hernias identified    Genitourinary  · External Genitalia: normal appearance for age, no discharge present, no tenderness present, no inflammatory lesions present, no masses present, no atrophy present  · Vagina: normal vaginal vault without central or paravaginal defects, no discharge present, no inflammatory lesions present, no masses present  · Bladder: non-tender to palpation  · Urethra: appears normal  · Cervix: normal   · Uterus: normal size, shape and consistency  · Adnexa: no adnexal tenderness present, no adnexal masses present  · Perineum: perineum within normal limits, no evidence of trauma, no rashes or skin lesions present  · Anus: anus within normal limits, no hemorrhoids present  · Inguinal Lymph Nodes: no lymphadenopathy present    Skin  · General Inspection: no rash, no lesions identified    Neurologic/Psychiatric  · Mental Status:  · Orientation: grossly oriented to person, place and time  · Mood and Affect: mood normal, affect appropriate    Assessment:  Routine gynecologic examination  Her current medical status is satisfactory with no evidence of significant gynecologic issues. Amenorrhea- likely POF- will repeat labs (as outside labs were salivary), hot flashes- will start climara pro. Discussed still possibility of spontaneous ovulation, pt is not concerned since h/o male factor infertility. Will start calcium and vit D.   Father with pancreatic cancer- EMPWER testing today    Plan:  Counseled re: diet, exercise, healthy lifestyle  Return for yearly wellness visits  Gardisil counseling provided  Pt counseled regarding co-testing for high risk HPV with pap  Rec screening mammo at either 37 or 42

## 2022-03-02 LAB
FSH SERPL-ACNC: 95.3 MIU/ML
LH SERPL-ACNC: 38.2 MIU/ML

## 2022-03-03 ENCOUNTER — PATIENT MESSAGE (OUTPATIENT)
Dept: OBGYN CLINIC | Age: 42
End: 2022-03-03

## 2022-03-03 LAB — ESTRADIOL SERPL-MCNC: 9.5 PG/ML

## 2022-03-04 RX ORDER — MEDROXYPROGESTERONE ACETATE 2.5 MG/1
2.5 TABLET ORAL DAILY
Qty: 90 TABLET | Refills: 4 | Status: SHIPPED | OUTPATIENT
Start: 2022-03-04

## 2022-03-04 RX ORDER — ESTRADIOL 0.05 MG/D
1 PATCH TRANSDERMAL
Qty: 12 PATCH | Refills: 4 | Status: SHIPPED | OUTPATIENT
Start: 2022-03-05

## 2022-03-19 PROBLEM — N93.8 DUB (DYSFUNCTIONAL UTERINE BLEEDING): Status: ACTIVE | Noted: 2019-01-01

## 2022-08-17 DIAGNOSIS — E66.9 OBESITY (BMI 30.0-34.9): ICD-10-CM

## 2022-08-17 RX ORDER — TOPIRAMATE 25 MG/1
25 TABLET ORAL
Qty: 90 TABLET | Refills: 1 | Status: SHIPPED | OUTPATIENT
Start: 2022-08-17

## 2022-08-23 ENCOUNTER — TELEPHONE (OUTPATIENT)
Dept: OBGYN CLINIC | Age: 42
End: 2022-08-23

## 2023-02-10 DIAGNOSIS — E66.9 OBESITY (BMI 30.0-34.9): ICD-10-CM

## 2023-02-10 RX ORDER — TOPIRAMATE 25 MG/1
25 TABLET ORAL
Qty: 90 TABLET | Refills: 1 | Status: SHIPPED | OUTPATIENT
Start: 2023-02-10

## 2023-04-25 NOTE — PROGRESS NOTES
[place] is a 38 y.o. female returns for an annual exam     Chief Complaint   Patient presents with    Well Woman       Patient's last menstrual period was 04/09/2023. She c/o abnormal bleeding. Problems:  AUB  Birth Control: none. Last Pap: normal obtained 5 year(s) ago. She does not have a history of ADELINE 2, 3 or cervical cancer. Last Mammogram: has never had a mammogram.         1. Have you been to the ER, urgent care clinic, or hospitalized since your last visit? No    2. Have you seen or consulted any other health care providers outside of the [address] [place] [address] since your last visit? No    Examination chaperoned by Heather Lee CMA.

## 2023-04-26 ENCOUNTER — OFFICE VISIT (OUTPATIENT)
Dept: OBGYN CLINIC | Age: 43
End: 2023-04-26
Payer: COMMERCIAL

## 2023-04-26 VITALS — DIASTOLIC BLOOD PRESSURE: 98 MMHG | BODY MASS INDEX: 37.95 KG/M2 | SYSTOLIC BLOOD PRESSURE: 155 MMHG | WEIGHT: 257 LBS

## 2023-04-26 DIAGNOSIS — Z01.419 ENCOUNTER FOR GYNECOLOGICAL EXAMINATION WITHOUT ABNORMAL FINDING: Primary | ICD-10-CM

## 2023-04-26 PROCEDURE — 99396 PREV VISIT EST AGE 40-64: CPT | Performed by: OBSTETRICS & GYNECOLOGY

## 2023-04-26 RX ORDER — ESTRADIOL 0.05 MG/D
1 PATCH TRANSDERMAL
Qty: 12 PATCH | Refills: 4 | Status: SHIPPED | OUTPATIENT
Start: 2023-04-29

## 2023-04-26 RX ORDER — MEDROXYPROGESTERONE ACETATE 5 MG/1
5 TABLET ORAL DAILY
Qty: 90 TABLET | Refills: 4 | Status: SHIPPED | OUTPATIENT
Start: 2023-04-26

## 2023-04-26 NOTE — PROGRESS NOTES
Annual exam  Nadira Paniagua is a ,  38 y.o. female   Patient's last menstrual period was 2023. She presents for her annual checkup. She is having heavy vaginal bleeding    Menstrual status:    Pt has bleeding every few months sometimes twice a month. Will be heavy, often soaking pad every hour. Previously had blood work that showed she was menopausal/POF last year. She was having significant hot flashes and was started on HRT (h/o infertility so ocps not needed). Reports hot flashes resolved. Previously had normal SIS in , thickened endometrium of uterus in  with normal secretory endometrial biopsy. Was scheduled for ablation in  which was cancelled due to Covid. Sexual history:    She  reports being sexually active and has had partner(s) who are male. She reports using the following method of birth control/protection: None. Per Nursing Note:  Patient's last menstrual period was 2023. She c/o abnormal bleeding. Problems:  AUB  Birth Control: none. Last Pap: normal obtained 5 year(s) ago. She does not have a history of ADELINE 2, 3 or cervical cancer. Last Mammogram: has never had a mammogram.     Past Medical History:   Diagnosis Date    AR (allergic rhinitis)     Basal cell carcinoma of face     left hairline    DUB (dysfunctional uterine bleeding)     Dr. Brittany Espino    Elevated HDL     Infertility management      w dysmotility. she had IVF.      Knee pain, bilateral     softball injuries    Miscarriage 2012    Pap smear 2009    Dr. Brittany Espino    Pap smear for cervical cancer screening 13 neg HPV NEG 18 neg HPV NEG    Recurrent UTI 6/3/2013     Past Surgical History:   Procedure Laterality Date    HX MALIGNANT SKIN LESION EXCISION      basal cell       Current Outpatient Medications   Medication Sig Dispense Refill    topiramate (TOPAMAX) 25 mg tablet TAKE 1 TABLET BY MOUTH NIGHTLY 90 Tablet 1    estradioL (CLIMARA) 0.05 mg/24 hr 1 Patch by TransDERmal route Every Saturday. 12 Patch 4    medroxyPROGESTERone (PROVERA) 2.5 mg tablet Take 1 Tablet by mouth daily. 90 Tablet 4    phentermine (ADIPEX-P) 37.5 mg tablet Take 1/2 pill in AM and 1/2 pill in afternoon  Indications: weight loss management for an obese person 90 Tablet 1    mv-min/iron/folic/calcium/vitK (WOMEN'S MULTIVITAMIN PO) Take  by mouth daily. (Patient not taking: Reported on 4/26/2023)       Allergies: Latex     Tobacco History:  reports that she has never smoked. She has never used smokeless tobacco.  Alcohol Abuse:  reports current alcohol use of about 0.8 standard drinks per week. Drug Abuse:  reports no history of drug use.     Family Medical/Cancer History:   Family History   Problem Relation Age of Onset    Cancer Father 48        pancreatic, dx 2/2009    Cancer Paternal Grandfather         cancer, unknown origin    Diabetes Maternal Grandmother     Hypertension Maternal Grandmother     Diabetes Paternal Grandmother     Hypertension Paternal Grandmother         Review of Systems - History obtained from the patient  Constitutional: negative for weight loss, fever, night sweats  HEENT: negative for hearing loss, earache, congestion, snoring, sorethroat  CV: negative for chest pain, palpitations, edema  Resp: negative for cough, shortness of breath, wheezing  GI: negative for change in bowel habits, abdominal pain, black or bloody stools  : negative for frequency, dysuria, hematuria, vaginal discharge  MSK: negative for back pain, joint pain, muscle pain  Breast: negative for breast lumps, nipple discharge, galactorrhea  Skin :negative for itching, rash, hives  Neuro: negative for dizziness, headache, confusion, weakness  Psych: negative for anxiety, depression, change in mood  Heme/lymph: negative for bleeding, bruising, pallor    Physical Exam    Visit Vitals  BP (!) 155/98   Wt 257 lb (116.6 kg)   LMP 04/09/2023   BMI 37.95 kg/m² Constitutional  Appearance: well-nourished, well developed, alert, in no acute distress    HENT  Head and Face: appears normal    Neck  Inspection/Palpation: normal appearance, no masses or tenderness  Lymph Nodes: no lymphadenopathy present  Thyroid: gland size normal, nontender, no nodules or masses present on palpation    Chest  Respiratory Effort: breathing unlabored    Breasts  Inspection of Breasts: breasts symmetrical, no skin changes, no discharge present, nipple appearance normal, no skin retraction present  Palpation of Breasts and Axillae: no masses present on palpation, no breast tenderness  Axillary Lymph Nodes: no lymphadenopathy present    Gastrointestinal  Abdominal Examination: abdomen non-tender to palpation, normal bowel sounds, no masses present  Liver and spleen: no hepatomegaly present, spleen not palpable  Hernias: no hernias identified    Genitourinary  External Genitalia: normal appearance for age, no discharge present, no tenderness present, no inflammatory lesions present, no masses present, no atrophy present  Vagina: normal vaginal vault without central or paravaginal defects, no discharge present, no inflammatory lesions present, no masses present  Bladder: non-tender to palpation  Urethra: appears normal  Cervix: normal   Uterus: normal size, shape and consistency  Adnexa: no adnexal tenderness present, no adnexal masses present  Perineum: perineum within normal limits, no evidence of trauma, no rashes or skin lesions present  Anus: anus within normal limits, no hemorrhoids present  Inguinal Lymph Nodes: no lymphadenopathy present    Skin  General Inspection: no rash, no lesions identified    Neurologic/Psychiatric  Mental Status:  Orientation: grossly oriented to person, place and time  Mood and Affect: mood normal, affect appropriate    Assessment:  Routine gynecologic examination  AUB- unclear if AUB (unclear if pt truly menopausal because she has not gone 1 year without a period). Will rto for SIS, discussed hysteroscopy/D&C/Possible Polypectomy/Ablation.      Plan:  Counseled re: diet, exercise, healthy lifestyle  Return for yearly wellness visits  Pt counseled regarding co-testing for high risk HPV with pap  Rec screening mammo at either 37 or 42

## 2023-04-28 RX ORDER — NITROFURANTOIN 25; 75 MG/1; MG/1
100 CAPSULE ORAL
Qty: 30 CAPSULE | Refills: 11 | Status: SHIPPED | OUTPATIENT
Start: 2023-04-28

## 2023-05-02 LAB
CYTOLOGIST CVX/VAG CYTO: NORMAL
CYTOLOGY CVX/VAG DOC CYTO: NORMAL
CYTOLOGY CVX/VAG DOC THIN PREP: NORMAL
CYTOLOGY HISTORY:: NORMAL
DX ICD CODE: NORMAL
HPV I/H RISK 4 DNA CVX QL PROBE+SIG AMP: NEGATIVE
Lab: NORMAL
OTHER STN SPEC: NORMAL
STAT OF ADQ CVX/VAG CYTO-IMP: NORMAL

## 2023-05-12 ENCOUNTER — TELEPHONE (OUTPATIENT)
Age: 43
End: 2023-05-12

## 2023-05-12 RX ORDER — MEDROXYPROGESTERONE ACETATE 2.5 MG/1
TABLET ORAL
Qty: 90 TABLET | Refills: 4 | Status: SHIPPED | OUTPATIENT
Start: 2023-05-12

## 2023-05-12 NOTE — TELEPHONE ENCOUNTER
38year old patient last seen in the office on 4/26/2023 for ae     Visit Vitals      BP  (!) 155/98     Wt  257 lb (116.6 kg)     LMP  04/09/2023        BMI  37.95 kg/m²         Please advise regarding pharmacy pended medication    Please amen/sign/refuse    Thank you

## 2023-06-05 ENCOUNTER — OFFICE VISIT (OUTPATIENT)
Age: 43
End: 2023-06-05

## 2023-06-05 ENCOUNTER — HOSPITAL ENCOUNTER (OUTPATIENT)
Facility: HOSPITAL | Age: 43
Setting detail: SPECIMEN
Discharge: HOME | End: 2023-06-08

## 2023-06-05 DIAGNOSIS — N93.9 ABNORMAL UTERINE BLEEDING (AUB): Primary | ICD-10-CM

## 2023-08-31 ENCOUNTER — PREP FOR PROCEDURE (OUTPATIENT)
Facility: HOSPITAL | Age: 43
End: 2023-08-31

## 2023-08-31 DIAGNOSIS — N93.9 ABNORMAL UTERINE BLEEDING: Primary | ICD-10-CM

## 2024-04-12 SDOH — ECONOMIC STABILITY: HOUSING INSECURITY
IN THE LAST 12 MONTHS, WAS THERE A TIME WHEN YOU DID NOT HAVE A STEADY PLACE TO SLEEP OR SLEPT IN A SHELTER (INCLUDING NOW)?: NO

## 2024-04-12 SDOH — ECONOMIC STABILITY: FOOD INSECURITY: WITHIN THE PAST 12 MONTHS, THE FOOD YOU BOUGHT JUST DIDN'T LAST AND YOU DIDN'T HAVE MONEY TO GET MORE.: NEVER TRUE

## 2024-04-12 SDOH — ECONOMIC STABILITY: FOOD INSECURITY: WITHIN THE PAST 12 MONTHS, YOU WORRIED THAT YOUR FOOD WOULD RUN OUT BEFORE YOU GOT MONEY TO BUY MORE.: NEVER TRUE

## 2024-04-12 SDOH — ECONOMIC STABILITY: INCOME INSECURITY: HOW HARD IS IT FOR YOU TO PAY FOR THE VERY BASICS LIKE FOOD, HOUSING, MEDICAL CARE, AND HEATING?: NOT VERY HARD

## 2024-04-12 SDOH — ECONOMIC STABILITY: TRANSPORTATION INSECURITY
IN THE PAST 12 MONTHS, HAS LACK OF TRANSPORTATION KEPT YOU FROM MEETINGS, WORK, OR FROM GETTING THINGS NEEDED FOR DAILY LIVING?: NO

## 2024-04-15 ENCOUNTER — OFFICE VISIT (OUTPATIENT)
Age: 44
End: 2024-04-15
Payer: COMMERCIAL

## 2024-04-15 VITALS
BODY MASS INDEX: 39.9 KG/M2 | HEIGHT: 69 IN | HEART RATE: 93 BPM | RESPIRATION RATE: 19 BRPM | DIASTOLIC BLOOD PRESSURE: 96 MMHG | WEIGHT: 269.4 LBS | TEMPERATURE: 98.1 F | OXYGEN SATURATION: 98 % | SYSTOLIC BLOOD PRESSURE: 140 MMHG

## 2024-04-15 DIAGNOSIS — R63.5 WEIGHT GAIN: ICD-10-CM

## 2024-04-15 DIAGNOSIS — E66.9 OBESITY (BMI 30-39.9): Primary | ICD-10-CM

## 2024-04-15 DIAGNOSIS — Z00.00 PREVENTATIVE HEALTH CARE: ICD-10-CM

## 2024-04-15 DIAGNOSIS — R03.0 WHITE COAT SYNDROME WITHOUT DIAGNOSIS OF HYPERTENSION: ICD-10-CM

## 2024-04-15 PROCEDURE — 99213 OFFICE O/P EST LOW 20 MIN: CPT | Performed by: INTERNAL MEDICINE

## 2024-04-15 RX ORDER — PHENTERMINE HYDROCHLORIDE 37.5 MG/1
TABLET ORAL
Qty: 30 TABLET | Refills: 2 | Status: SHIPPED | OUTPATIENT
Start: 2024-04-15 | End: 2024-07-14

## 2024-04-15 RX ORDER — TOPIRAMATE 50 MG/1
50 TABLET, FILM COATED ORAL
Qty: 30 TABLET | Refills: 2 | Status: SHIPPED | OUTPATIENT
Start: 2024-04-15

## 2024-04-15 RX ORDER — CALCIUM CARBONATE 500(1250)
500 TABLET ORAL DAILY
COMMUNITY

## 2024-04-15 ASSESSMENT — PATIENT HEALTH QUESTIONNAIRE - PHQ9
1. LITTLE INTEREST OR PLEASURE IN DOING THINGS: NOT AT ALL
SUM OF ALL RESPONSES TO PHQ QUESTIONS 1-9: 0
SUM OF ALL RESPONSES TO PHQ QUESTIONS 1-9: 0
SUM OF ALL RESPONSES TO PHQ9 QUESTIONS 1 & 2: 0
SUM OF ALL RESPONSES TO PHQ QUESTIONS 1-9: 0
SUM OF ALL RESPONSES TO PHQ QUESTIONS 1-9: 0
2. FEELING DOWN, DEPRESSED OR HOPELESS: NOT AT ALL

## 2024-04-15 NOTE — PROGRESS NOTES
\"Have you been to the ER, urgent care clinic since your last visit?  Hospitalized since your last visit?\"    YES - When: approximately 5 months ago.  Where and Why: Urgent Care - pneumonia.    “Have you seen or consulted any other health care providers outside of [facility] since your last visit?”    NO            Click Here for Release of Records Request    
current alcohol use of about 2.0 standard drinks of alcohol per week. She reports that she does not use drugs.    family history includes Cancer in her paternal grandfather; Cancer (age of onset: 53) in her father; Diabetes in her maternal grandmother, mother, and paternal grandmother; Early Death in her father; High Blood Pressure in her mother; High Cholesterol in her mother; Hypertension in her maternal grandmother and paternal grandmother.    Physical Exam   Nursing note and vitals reviewed.  Blood pressure (!) 140/96, pulse 93, temperature 98.1 °F (36.7 °C), temperature source Oral, resp. rate 19, height 1.753 m (5' 9\"), weight 122.2 kg (269 lb 6.4 oz), SpO2 98 %.  Constitutional:  No distress.    Eyes: Conjunctivae are normal.   Ears:  Hearing grossly intact  Cardiovascular: Normal rate. regular rhythm, no murmurs or gallops  No edema  Pulmonary/Chest: Effort normal.   CTAB  Musculoskeletal: moves all 4 extremities   Neurological: Alert and oriented to person, place, and time.   Skin: No visible rash noted.   Psychiatric: Normal mood and affect. Behavior is normal.     Assessment and Plan    Adriane was seen today for weight loss, medication check and lab collection.    Diagnoses and all orders for this visit:    Obesity (BMI 30-39.9)  Progressive obesity with BMI close to 40 right now.  She has attempted various diets and remains as physically active as she can.  Denies any significant issues with cravings.  She had a previous history of benefit with phentermine and topiramate, but did not work as well last time.  It does not seem that GLP and GIP medication such as Zepbound and Wegovy are covered by insurance, but she will look into that.  Will resume phentermine and topiramate.  Could consider bariatric consultation.  -     phentermine (ADIPEX-P) 37.5 MG tablet; Take 1/2 pill in AM and 1/2 pill early afternoon  -     topiramate (TOPAMAX) 50 MG tablet; Take 1 tablet by mouth nightly    Weight gain  Rule

## 2024-04-16 LAB
ALBUMIN SERPL-MCNC: 3.9 G/DL (ref 3.5–5)
ALBUMIN/GLOB SERPL: 1.1 (ref 1.1–2.2)
ALP SERPL-CCNC: 90 U/L (ref 45–117)
ALT SERPL-CCNC: 37 U/L (ref 12–78)
ANION GAP SERPL CALC-SCNC: 6 MMOL/L (ref 5–15)
AST SERPL-CCNC: 28 U/L (ref 15–37)
BILIRUB SERPL-MCNC: 0.5 MG/DL (ref 0.2–1)
BUN SERPL-MCNC: 9 MG/DL (ref 6–20)
BUN/CREAT SERPL: 11 (ref 12–20)
CALCIUM SERPL-MCNC: 9.3 MG/DL (ref 8.5–10.1)
CHLORIDE SERPL-SCNC: 110 MMOL/L (ref 97–108)
CHOLEST SERPL-MCNC: 219 MG/DL
CO2 SERPL-SCNC: 24 MMOL/L (ref 21–32)
CREAT SERPL-MCNC: 0.85 MG/DL (ref 0.55–1.02)
ERYTHROCYTE [DISTWIDTH] IN BLOOD BY AUTOMATED COUNT: 13.5 % (ref 11.5–14.5)
EST. AVERAGE GLUCOSE BLD GHB EST-MCNC: 97 MG/DL
GLOBULIN SER CALC-MCNC: 3.4 G/DL (ref 2–4)
GLUCOSE SERPL-MCNC: 96 MG/DL (ref 65–100)
HBA1C MFR BLD: 5 % (ref 4–5.6)
HCT VFR BLD AUTO: 40.9 % (ref 35–47)
HDLC SERPL-MCNC: 86 MG/DL
HDLC SERPL: 2.5 (ref 0–5)
HGB BLD-MCNC: 14 G/DL (ref 11.5–16)
LDLC SERPL CALC-MCNC: 111 MG/DL (ref 0–100)
MCH RBC QN AUTO: 29.7 PG (ref 26–34)
MCHC RBC AUTO-ENTMCNC: 34.2 G/DL (ref 30–36.5)
MCV RBC AUTO: 86.8 FL (ref 80–99)
NRBC # BLD: 0 K/UL (ref 0–0.01)
NRBC BLD-RTO: 0 PER 100 WBC
PLATELET # BLD AUTO: 200 K/UL (ref 150–400)
PMV BLD AUTO: 9.5 FL (ref 8.9–12.9)
POTASSIUM SERPL-SCNC: 3.9 MMOL/L (ref 3.5–5.1)
PROT SERPL-MCNC: 7.3 G/DL (ref 6.4–8.2)
RBC # BLD AUTO: 4.71 M/UL (ref 3.8–5.2)
SODIUM SERPL-SCNC: 140 MMOL/L (ref 136–145)
TRIGL SERPL-MCNC: 110 MG/DL
TSH SERPL DL<=0.05 MIU/L-ACNC: 1.63 UIU/ML (ref 0.36–3.74)
VLDLC SERPL CALC-MCNC: 22 MG/DL
WBC # BLD AUTO: 6.5 K/UL (ref 3.6–11)

## 2024-07-11 DIAGNOSIS — E66.9 OBESITY (BMI 30-39.9): ICD-10-CM

## 2024-07-11 RX ORDER — MEDROXYPROGESTERONE ACETATE 5 MG/1
5 TABLET ORAL DAILY
Qty: 90 TABLET | Refills: 4 | OUTPATIENT
Start: 2024-07-11

## 2024-07-11 RX ORDER — TOPIRAMATE 50 MG/1
50 TABLET, FILM COATED ORAL NIGHTLY
Qty: 90 TABLET | Refills: 0 | Status: SHIPPED | OUTPATIENT
Start: 2024-07-11

## 2024-07-11 RX ORDER — ESTRADIOL 0.05 MG/D
PATCH TRANSDERMAL
Qty: 12 PATCH | Refills: 4 | OUTPATIENT
Start: 2024-07-11

## 2024-07-16 NOTE — PROGRESS NOTES
Adriane Grey is a 43 y.o. female returns for an annual exam     Chief Complaint   Patient presents with    Annual Exam       Patient's last menstrual period was 06/15/2024 (approximate).  Her periods are varies in flow and often irregular with no apparent pattern.  She has dysmenorrhea.  Problems: no problems  Birth Control: none.  Last Pap: normal obtained 1 year(s) ago on 4/26/23.  She does not have a history of ARELY 2, 3 or cervical cancer.   Last Mammogram: has never had a mammogram.     Examination chaperoned by Yanique Hernandez MA.

## 2024-07-17 ENCOUNTER — OFFICE VISIT (OUTPATIENT)
Age: 44
End: 2024-07-17
Payer: COMMERCIAL

## 2024-07-17 VITALS
SYSTOLIC BLOOD PRESSURE: 130 MMHG | BODY MASS INDEX: 37.07 KG/M2 | WEIGHT: 251 LBS | DIASTOLIC BLOOD PRESSURE: 91 MMHG | HEART RATE: 94 BPM

## 2024-07-17 DIAGNOSIS — Z01.419 ENCOUNTER FOR WELL WOMAN EXAM WITH ROUTINE GYNECOLOGICAL EXAM: Primary | ICD-10-CM

## 2024-07-17 PROCEDURE — 99396 PREV VISIT EST AGE 40-64: CPT | Performed by: OBSTETRICS & GYNECOLOGY

## 2024-07-17 PROCEDURE — 99459 PELVIC EXAMINATION: CPT | Performed by: OBSTETRICS & GYNECOLOGY

## 2024-07-17 RX ORDER — ESTRADIOL AND NORETHINDRONE ACETATE 1; .5 MG/1; MG/1
1 TABLET ORAL DAILY
Qty: 90 TABLET | Refills: 3 | Status: SHIPPED | OUTPATIENT
Start: 2024-07-17

## 2024-07-17 NOTE — PATIENT INSTRUCTIONS
much sun, wash your hands, brush your teeth twice a day, and wear a seat belt in the car.   Where can you learn more?  Go to [email or web address]://[email or web address].net/patientEd and enter P072 to learn more about \"Well Visit, Ages 18 to 65: Care Instructions.\"  Current as of: August 6, 2023  Content Version: 14.1  © 8619-2491 [employer].   Care instructions adapted under license by [employer]. If you have questions about a medical condition or this instruction, always ask your healthcare professional. [employer] disclaims any warranty or liability for your use of this information.

## 2024-07-17 NOTE — PROGRESS NOTES
Annual exam  Adriane Grey is a No obstetric history on file.,  43 y.o. female   Patient's last menstrual period was 06/15/2024 (approximate).    She presents for her annual checkup.     She has no significant complaints.  Blood work previously showed that she was perimenopausal but has not gone 1 year without a period.  Started on HRT instead of birth control due to hot flashes and was unable to tolerate ocps due to mood swings.    Menstrual status:    Still gets periods but only every few months.    Sexual history:    She  reports being sexually active and has had partner(s) who are male. She reports using the following method of birth control/protection: None.    Per Nursing Note:  Patient's last menstrual period was 06/15/2024 (approximate).  Her periods are varies in flow and often irregular with no apparent pattern.  She has dysmenorrhea.  Problems: no problems  Birth Control: none.  Last Pap: normal obtained 1 year(s) ago on 23.  She does not have a history of ARELY 2, 3 or cervical cancer.   Last Mammogram: has never had a mammogram.     Past Medical History:   Diagnosis Date    AR (allergic rhinitis)     Basal cell carcinoma of face     left hairline    DUB (dysfunctional uterine bleeding)     Dr. Mccarthy    Elevated HDL     Infertility management      w dysmotility.  she had IVF.     Knee pain, bilateral     softball injuries    Miscarriage 2012    Obesity     Pap smear for cervical cancer screening 13 neg HPV NEG 18 neg HPV NEG    Recurrent UTI 6/3/2013     Past Surgical History:   Procedure Laterality Date     SECTION  2014    MALIGNANT SKIN LESION EXCISION      basal cell       Current Outpatient Medications   Medication Sig Dispense Refill    PHENTERMINE HCL PO 37.5 mg      topiramate (TOPAMAX) 50 MG tablet TAKE 1 TABLET BY MOUTH EVERY DAY AT NIGHT 90 tablet 0    Multiple Vitamins-Minerals (WOMENS MULTIVITAMIN) TABS Take by mouth daily

## 2024-10-13 DIAGNOSIS — E66.9 OBESITY (BMI 30-39.9): ICD-10-CM

## 2024-10-14 RX ORDER — TOPIRAMATE 50 MG/1
50 TABLET, FILM COATED ORAL NIGHTLY
Qty: 90 TABLET | Refills: 0 | Status: SHIPPED | OUTPATIENT
Start: 2024-10-14

## 2024-11-22 ENCOUNTER — OFFICE VISIT (OUTPATIENT)
Facility: CLINIC | Age: 44
End: 2024-11-22
Payer: COMMERCIAL

## 2024-11-22 VITALS
TEMPERATURE: 98.2 F | RESPIRATION RATE: 19 BRPM | HEIGHT: 69 IN | BODY MASS INDEX: 37.26 KG/M2 | OXYGEN SATURATION: 99 % | HEART RATE: 85 BPM | DIASTOLIC BLOOD PRESSURE: 94 MMHG | SYSTOLIC BLOOD PRESSURE: 148 MMHG | WEIGHT: 251.6 LBS

## 2024-11-22 DIAGNOSIS — R03.0 ELEVATED BLOOD PRESSURE READING WITHOUT DIAGNOSIS OF HYPERTENSION: ICD-10-CM

## 2024-11-22 DIAGNOSIS — E66.9 OBESITY (BMI 30-39.9): Primary | ICD-10-CM

## 2024-11-22 PROBLEM — D22.9 MULTIPLE BENIGN MELANOCYTIC NEVI: Status: ACTIVE | Noted: 2024-11-22

## 2024-11-22 PROBLEM — Z71.9 ENCOUNTER FOR HEALTH EDUCATION: Status: ACTIVE | Noted: 2024-11-22

## 2024-11-22 PROBLEM — Z85.828 HISTORY OF BASAL CELL CARCINOMA (BCC): Status: ACTIVE | Noted: 2024-11-22

## 2024-11-22 PROCEDURE — 99213 OFFICE O/P EST LOW 20 MIN: CPT | Performed by: INTERNAL MEDICINE

## 2024-11-22 NOTE — PROGRESS NOTES
\"Have you been to the ER, urgent care clinic since your last visit?  Hospitalized since your last visit?\"    NO    “Have you seen or consulted any other health care providers outside our system since your last visit?”    NO             
further evaluation if new symptoms develop        Current Outpatient Medications   Medication Sig    Semaglutide, 1 MG/DOSE, 2 MG/1.5ML SOPN Use as directed    estradiol-norethindrone (ACTIVELLA) 1-0.5 MG per tablet Take 1 tablet by mouth daily    Multiple Vitamins-Minerals (WOMENS MULTIVITAMIN) TABS Take by mouth daily    calcium carbonate (OSCAL) 500 MG TABS tablet Take 1 tablet by mouth daily     No current facility-administered medications for this visit.

## 2025-03-12 ENCOUNTER — PATIENT MESSAGE (OUTPATIENT)
Facility: CLINIC | Age: 45
End: 2025-03-12

## 2025-03-14 RX ORDER — SCOPOLAMINE 1 MG/3D
1 PATCH, EXTENDED RELEASE TRANSDERMAL
Qty: 4 PATCH | Refills: 0 | Status: SHIPPED | OUTPATIENT
Start: 2025-03-14

## 2025-03-14 RX ORDER — ONDANSETRON 8 MG/1
8 TABLET, ORALLY DISINTEGRATING ORAL EVERY 8 HOURS PRN
Qty: 21 TABLET | Refills: 0 | Status: SHIPPED | OUTPATIENT
Start: 2025-03-14

## 2025-04-14 ENCOUNTER — TELEPHONE (OUTPATIENT)
Facility: CLINIC | Age: 45
End: 2025-04-14

## 2025-04-14 DIAGNOSIS — E66.9 OBESITY (BMI 30-39.9): ICD-10-CM

## 2025-04-14 NOTE — TELEPHONE ENCOUNTER
I recommend that she continue 0.5 mg semaglutide weekly.  Recommend that she fill a 10 mg via.  Disp 4 mL vial of 2.5 mg/mL.  Instructions of 0.5 mg weekly subcutaneously.  This should last for 20 doses and I think cost for $495.  Can you please call it in?

## 2025-04-14 NOTE — TELEPHONE ENCOUNTER
Spoke with Pharmacist Toy from [facility] regarding patients refill for her Semaglutide. Updated on DR. Raza's orders and he states understanding and grateful for the call.

## 2025-04-14 NOTE — TELEPHONE ENCOUNTER
Spoke with patient and she reports she would like to stay on the 0.5 mg dose. She is asking if she can get a larger quantity since the compounding will end on 5/22/25. She will get with Dr. Raza about how to address the compounding ending and alternatives. Grateful for the call.

## 2025-06-15 ENCOUNTER — TELEPHONE (OUTPATIENT)
Age: 45
End: 2025-06-15

## 2025-06-16 RX ORDER — ESTRADIOL AND NORETHINDRONE ACETATE 1; .5 MG/1; MG/1
1 TABLET, FILM COATED ORAL DAILY
Qty: 84 TABLET | Refills: 1 | Status: SHIPPED | OUTPATIENT
Start: 2025-06-16